# Patient Record
Sex: MALE | Race: WHITE | Employment: OTHER | URBAN - METROPOLITAN AREA
[De-identification: names, ages, dates, MRNs, and addresses within clinical notes are randomized per-mention and may not be internally consistent; named-entity substitution may affect disease eponyms.]

---

## 2017-04-16 ENCOUNTER — APPOINTMENT (OUTPATIENT)
Dept: CT IMAGING | Age: 67
End: 2017-04-16
Attending: EMERGENCY MEDICINE
Payer: MEDICARE

## 2017-04-16 ENCOUNTER — HOSPITAL ENCOUNTER (EMERGENCY)
Age: 67
Discharge: HOME | End: 2017-04-16
Attending: EMERGENCY MEDICINE
Payer: MEDICARE

## 2017-04-16 VITALS
HEIGHT: 72 IN | OXYGEN SATURATION: 100 % | WEIGHT: 196 LBS | RESPIRATION RATE: 16 BRPM | SYSTOLIC BLOOD PRESSURE: 143 MMHG | TEMPERATURE: 98 F | BODY MASS INDEX: 26.55 KG/M2 | HEART RATE: 68 BPM | DIASTOLIC BLOOD PRESSURE: 85 MMHG

## 2017-04-16 DIAGNOSIS — K04.7 DENTAL ABSCESS: Primary | ICD-10-CM

## 2017-04-16 LAB
ANION GAP BLD CALC-SCNC: 11 MMOL/L (ref 3–18)
BASOPHILS # BLD AUTO: 0 K/UL (ref 0–0.06)
BASOPHILS # BLD: 0 % (ref 0–2)
BUN SERPL-MCNC: 18 MG/DL (ref 7–18)
BUN/CREAT SERPL: 13 (ref 12–20)
CALCIUM SERPL-MCNC: 9.7 MG/DL (ref 8.5–10.1)
CHLORIDE SERPL-SCNC: 104 MMOL/L (ref 100–108)
CO2 SERPL-SCNC: 26 MMOL/L (ref 21–32)
CREAT SERPL-MCNC: 1.42 MG/DL (ref 0.6–1.3)
DIFFERENTIAL METHOD BLD: ABNORMAL
EOSINOPHIL # BLD: 0.1 K/UL (ref 0–0.4)
EOSINOPHIL NFR BLD: 1 % (ref 0–5)
ERYTHROCYTE [DISTWIDTH] IN BLOOD BY AUTOMATED COUNT: 13.1 % (ref 11.6–14.5)
GLUCOSE SERPL-MCNC: 116 MG/DL (ref 74–99)
HCT VFR BLD AUTO: 36.5 % (ref 36–48)
HGB BLD-MCNC: 12.4 G/DL (ref 13–16)
LYMPHOCYTES # BLD AUTO: 11 % (ref 21–52)
LYMPHOCYTES # BLD: 1.2 K/UL (ref 0.9–3.6)
MCH RBC QN AUTO: 31 PG (ref 24–34)
MCHC RBC AUTO-ENTMCNC: 34 G/DL (ref 31–37)
MCV RBC AUTO: 91.3 FL (ref 74–97)
MONOCYTES # BLD: 0.7 K/UL (ref 0.05–1.2)
MONOCYTES NFR BLD AUTO: 7 % (ref 3–10)
NEUTS SEG # BLD: 8.8 K/UL (ref 1.8–8)
NEUTS SEG NFR BLD AUTO: 81 % (ref 40–73)
PLATELET # BLD AUTO: 166 K/UL (ref 135–420)
PMV BLD AUTO: 9.5 FL (ref 9.2–11.8)
POTASSIUM SERPL-SCNC: 4.5 MMOL/L (ref 3.5–5.5)
RBC # BLD AUTO: 4 M/UL (ref 4.7–5.5)
SODIUM SERPL-SCNC: 141 MMOL/L (ref 136–145)
WBC # BLD AUTO: 10.9 K/UL (ref 4.6–13.2)

## 2017-04-16 PROCEDURE — 96375 TX/PRO/DX INJ NEW DRUG ADDON: CPT

## 2017-04-16 PROCEDURE — 74011636320 HC RX REV CODE- 636/320: Performed by: EMERGENCY MEDICINE

## 2017-04-16 PROCEDURE — 85025 COMPLETE CBC W/AUTO DIFF WBC: CPT | Performed by: EMERGENCY MEDICINE

## 2017-04-16 PROCEDURE — 99284 EMERGENCY DEPT VISIT MOD MDM: CPT

## 2017-04-16 PROCEDURE — 74011000250 HC RX REV CODE- 250: Performed by: EMERGENCY MEDICINE

## 2017-04-16 PROCEDURE — 80048 BASIC METABOLIC PNL TOTAL CA: CPT | Performed by: EMERGENCY MEDICINE

## 2017-04-16 PROCEDURE — 74011250636 HC RX REV CODE- 250/636: Performed by: EMERGENCY MEDICINE

## 2017-04-16 PROCEDURE — 96365 THER/PROPH/DIAG IV INF INIT: CPT

## 2017-04-16 PROCEDURE — 70491 CT SOFT TISSUE NECK W/DYE: CPT

## 2017-04-16 PROCEDURE — 74011000258 HC RX REV CODE- 258: Performed by: EMERGENCY MEDICINE

## 2017-04-16 RX ORDER — GUAIFENESIN 100 MG/5ML
81 LIQUID (ML) ORAL DAILY
COMMUNITY

## 2017-04-16 RX ORDER — SODIUM CHLORIDE 0.9 % (FLUSH) 0.9 %
5-10 SYRINGE (ML) INJECTION EVERY 8 HOURS
Status: DISCONTINUED | OUTPATIENT
Start: 2017-04-16 | End: 2017-04-17 | Stop reason: HOSPADM

## 2017-04-16 RX ORDER — RAMIPRIL 5 MG/1
5 CAPSULE ORAL DAILY
COMMUNITY
End: 2018-12-08

## 2017-04-16 RX ORDER — CLINDAMYCIN HYDROCHLORIDE 300 MG/1
300 CAPSULE ORAL 3 TIMES DAILY
Qty: 21 CAP | Refills: 0 | Status: SHIPPED | OUTPATIENT
Start: 2017-04-16 | End: 2017-04-26

## 2017-04-16 RX ORDER — KETOROLAC TROMETHAMINE 30 MG/ML
15 INJECTION, SOLUTION INTRAMUSCULAR; INTRAVENOUS
Status: COMPLETED | OUTPATIENT
Start: 2017-04-16 | End: 2017-04-16

## 2017-04-16 RX ORDER — HYDROCODONE BITARTRATE AND ACETAMINOPHEN 5; 325 MG/1; MG/1
1 TABLET ORAL
Qty: 20 TAB | Refills: 0 | Status: SHIPPED | OUTPATIENT
Start: 2017-04-16 | End: 2018-12-08

## 2017-04-16 RX ORDER — SODIUM CHLORIDE 0.9 % (FLUSH) 0.9 %
5-10 SYRINGE (ML) INJECTION AS NEEDED
Status: DISCONTINUED | OUTPATIENT
Start: 2017-04-16 | End: 2017-04-17 | Stop reason: HOSPADM

## 2017-04-16 RX ORDER — RABEPRAZOLE SODIUM 20 MG/1
20 TABLET, DELAYED RELEASE ORAL DAILY
Status: ON HOLD | COMMUNITY
End: 2018-12-08

## 2017-04-16 RX ADMIN — IOPAMIDOL 100 ML: 612 INJECTION, SOLUTION INTRAVENOUS at 21:30

## 2017-04-16 RX ADMIN — CLINDAMYCIN PHOSPHATE 900 MG: 150 INJECTION, SOLUTION, CONCENTRATE INTRAVENOUS at 20:19

## 2017-04-16 RX ADMIN — KETOROLAC TROMETHAMINE 15 MG: 30 INJECTION, SOLUTION INTRAMUSCULAR at 20:18

## 2017-04-16 NOTE — ED TRIAGE NOTES
C/o right sided facial pain, swelling since this morning, states he had dental work on 4/6/17 and has had runny nose. Sepsis Screening completed    (  )Patient meets SIRS criteria. (x  )Patient does not meet SIRS criteria.       SIRS Criteria is achieved when two or more of the following are present   Temperature < 96.8°F (36°C) or > 100.9°F (38.3°C)   Heart Rate > 90 beats per minute   Respiratory Rate > 20 breaths per minute   WBC count > 12,000 or <4,000 or > 10% bands

## 2017-04-16 NOTE — ED PROVIDER NOTES
HPI Comments: 7:45 PM   Stephanie Fierro is a 75 y.o. male presenting to the ED C/O right sided facial pain (8/10) with associated swelling onset yesterday evening. Pt had dental pain that started last night as well. Pt had dental work 10 days ago. PMHx of DM, high cholesterol, kidney stones, GERD, arrhythmia, and CKD. Pt is allergic to Sulfa. Pt denies sore throat and any other Sx or complaints. Patient is a 75 y.o. male presenting with facial pain. The history is provided by the patient. No  was used. Facial Pain    The incident occurred yesterday. He came to the ER via walk-in. The pain is at a severity of 8/10. There was no loss of consciousness. He has been behaving normally. Written by PATY Tamez, as dictated by Phuc Green MD    Past Medical History:   Diagnosis Date    Arrhythmia     \"skipped beat\". No problems since on metoprolol    Chronic kidney disease     kidney stones. Cr =1.9    Diabetes ([address] 2006    type 2    GERD (gastroesophageal reflux disease)     Irregular heart beats     Kidney stones     Other ill-defined conditions(799.89)     HIGH CHOLESTEROL    Reflux        Past Surgical History:   Procedure Laterality Date    HX ORTHOPAEDIC      KNEE    HX OTHER SURGICAL  1980's    varocele    HX UROLOGICAL  2010    lithotripsy X 1         No family history on file. Social History     Social History    Marital status:      Spouse name: N/A    Number of children: N/A    Years of education: N/A     Occupational History    Not on file.      Social History Main Topics    Smoking status: Never Smoker    Smokeless tobacco: Not on file    Alcohol use 0.5 oz/week     1 drink(s) per week    Drug use: No    Sexual activity: Not on file     Other Topics Concern    Not on file     Social History Narrative         ALLERGIES: Sulfa (sulfonamide antibiotics)    Review of Systems   HENT: Positive for dental problem and facial swelling (Right sided). Negative for sore throat. All other systems reviewed and are negative. Vitals:    04/16/17 1802 04/16/17 2015 04/16/17 2100   BP: (!) 141/93 (!) 155/91 142/81   Pulse: 75 72 71   Resp: 16 16 16   Temp: 98 °F (36.7 °C)     SpO2: 98% 97% 98%   Weight: 88.9 kg (196 lb)     Height: 6' (1.829 m)              Physical Exam   Constitutional: He is oriented to person, place, and time. He appears well-developed and well-nourished. No distress. No acute distress   HENT:   Head: Normocephalic. Mouth/Throat: Abnormal dentition (Dentition intact but mildly tender over tooth # 5 ). Obvious right face swelling with tenderness over the cheek. Nostril's clear. TM's clear. Throat clear   Eyes: Pupils are equal, round, and reactive to light. Neck: Neck supple. Cardiovascular: Normal rate, regular rhythm, S1 normal, S2 normal and normal heart sounds. Pulmonary/Chest: Breath sounds normal. No respiratory distress. He has no wheezes. He has no rales. He exhibits no tenderness. Abdominal: Soft. He exhibits no distension and no mass. There is no tenderness. There is no guarding. Musculoskeletal: Normal range of motion. He exhibits no edema or tenderness. Neurological: He is alert and oriented to person, place, and time. No cranial nerve deficit. Skin: No rash noted. Psychiatric: He has a normal mood and affect. His behavior is normal. Thought content normal.   Nursing note and vitals reviewed. RESULTS:    PULSE OXIMETRY NOTE:  7:49 PM   Pulse-ox is 98% on RA  Interpretation: Normal  Intervention: None      CT NECK SOFT TISSUE W CONT   Final Result   IMPRESSION:     Soft tissue swelling overlying right maxilla and nasolabial region. No abscess  formation. This could be related to dental disease, with an adjacent right  maxillary cuspid periapical lucency.  There is also partial opacification of the  maxillary sinuses.     As read by the radiologist.         Labs Reviewed   CBC WITH AUTOMATED DIFF - Abnormal; Notable for the following:        Result Value    RBC 4.00 (*)     HGB 12.4 (*)     NEUTROPHILS 81 (*)     LYMPHOCYTES 11 (*)     ABS. NEUTROPHILS 8.8 (*)     All other components within normal limits   METABOLIC PANEL, BASIC - Abnormal; Notable for the following:     Glucose 116 (*)     Creatinine 1.42 (*)     GFR est non-AA 50 (*)     All other components within normal limits       Recent Results (from the past 12 hour(s))   CBC WITH AUTOMATED DIFF    Collection Time: 04/16/17  8:15 PM   Result Value Ref Range    WBC 10.9 4.6 - 13.2 K/uL    RBC 4.00 (L) 4.70 - 5.50 M/uL    HGB 12.4 (L) 13.0 - 16.0 g/dL    HCT 36.5 36.0 - 48.0 %    MCV 91.3 74.0 - 97.0 FL    MCH 31.0 24.0 - 34.0 PG    MCHC 34.0 31.0 - 37.0 g/dL    RDW 13.1 11.6 - 14.5 %    PLATELET 330 150 - 941 K/uL    MPV 9.5 9.2 - 11.8 FL    NEUTROPHILS 81 (H) 40 - 73 %    LYMPHOCYTES 11 (L) 21 - 52 %    MONOCYTES 7 3 - 10 %    EOSINOPHILS 1 0 - 5 %    BASOPHILS 0 0 - 2 %    ABS. NEUTROPHILS 8.8 (H) 1.8 - 8.0 K/UL    ABS. LYMPHOCYTES 1.2 0.9 - 3.6 K/UL    ABS. MONOCYTES 0.7 0.05 - 1.2 K/UL    ABS. EOSINOPHILS 0.1 0.0 - 0.4 K/UL    ABS. BASOPHILS 0.0 0.0 - 0.06 K/UL    DF AUTOMATED     METABOLIC PANEL, BASIC    Collection Time: 04/16/17  8:15 PM   Result Value Ref Range    Sodium 141 136 - 145 mmol/L    Potassium 4.5 3.5 - 5.5 mmol/L    Chloride 104 100 - 108 mmol/L    CO2 26 21 - 32 mmol/L    Anion gap 11 3.0 - 18 mmol/L    Glucose 116 (H) 74 - 99 mg/dL    BUN 18 7.0 - 18 MG/DL    Creatinine 1.42 (H) 0.6 - 1.3 MG/DL    BUN/Creatinine ratio 13 12 - 20      GFR est AA >60 >60 ml/min/1.73m2    GFR est non-AA 50 (L) >60 ml/min/1.73m2    Calcium 9.7 8.5 - 10.1 MG/DL       MDM  Number of Diagnoses or Management Options  Diagnosis management comments: INITIAL CLINICAL IMPRESSION and PLANS:  The patient presents with the primary complaint(s) of: Right face swelling and pain.  The presentation, to include historical aspects and clinical findings are consistent with the DX of  Right face abscess. However, other possible DX's to consider as primary, associated with, or exacerbated by include:    sinusitis, dental abscess, trigeminal neurology, cellulitis    Considering the above, my initial management plan to evaluate and therapeutic interventions include the following and as noted in the orders:    1. Labs: CBC, Basic metabolic panel  2. Imaging: CT Maxillofacial LTD         Amount and/or Complexity of Data Reviewed  Clinical lab tests: reviewed and ordered (CBC, Basic metabolic panel)  Tests in the radiology section of CPT®: reviewed and ordered (CT Maxillofacial LTD)      ED Course     Medications   sodium chloride (NS) flush 5-10 mL (not administered)   sodium chloride (NS) flush 5-10 mL (not administered)   clindamycin phosphate 900 mg in 0.9% sodium chloride (MBP/ADV) 100 mL ADV (0 mg IntraVENous IV Completed 4/16/17 2048)   ketorolac (TORADOL) injection 15 mg (15 mg IntraVENous Given 4/16/17 2018)   iopamidol (ISOVUE 300) 61 % contrast injection 100 mL (100 mL IntraVENous Given 4/16/17 2130)      Procedures  PROGRESS NOTE:  7:45 PM  Initial assessment performed. Written by Bere Donald, ED Scribe, as dictated by Misael Rider MD     DISCHARGE NOTE:  10:47 PM   Myesha Cm  results have been reviewed with him. He has been counseled regarding his diagnosis, treatment, and plan. He verbally conveys understanding and agreement of the signs, symptoms, diagnosis, treatment and prognosis and additionally agrees to follow up as discussed. He also agrees with the care-plan and conveys that all of his questions have been answered. I have also provided discharge instructions for him that include: educational information regarding their diagnosis and treatment, and list of reasons why they would want to return to the ED prior to their follow-up appointment, should his condition change.  The patient and/or family has been provided with education for proper Emergency Department utilization. CLINICAL IMPRESSION:    1. Dental abscess        PLAN: DISCHARGE HOME    Follow-up Information     Follow up With Details Comments 605 Tico Martini MD Schedule an appointment as soon as possible for a visit in 1 day Follow up with your primary care physician [address] [place] [address] [phone]  [phone]      THE [facility] EMERGENCY DEPT Go to As needed, If symptoms worsen [address] [place] [facility] [address] [phone]          Current Discharge Medication List      START taking these medications    Details   clindamycin (CLEOCIN) 300 mg capsule Take 1 Cap by mouth three (3) times daily for 10 days. Qty: 21 Cap, Refills: 0      HYDROcodone-acetaminophen (NORCO) 5-325 mg per tablet Take 1 Tab by mouth every four (4) hours as needed for Pain. Max Daily Amount: 6 Tabs. Qty: 20 Tab, Refills: 0             ATTESTATIONS:  This note is prepared by Tonya Oropeza, acting as Scribe for Sheyla Gonzalez MD .    Sheyla Gonzalez MD : The scribe's documentation has been prepared under my direction and personally reviewed by me in its entirety. I confirm that the note above accurately reflects all work, treatment, procedures, and medical decision making performed by me.

## 2017-04-16 NOTE — Clinical Note
Please follow up with dentist 
Dr. Michaelle Vasquez, [address] 
[phone] [facility]: 
[address] [facility], [address] [facility] 
[phone] Fillings, Cleanings, and Extractions [address] [facility] [address] [facility] [place] [address] [place] 
[phone] Fillings, Cleanings, and Extractions [facility] [address] 
[phone] Fillings, Cleaning, and Extractions [facility] [address] [place], [address] [facility] 
[phone] [facility] [address] Cherylene Mullet, [address] [place] [address] 
[phone] Ages 3-18, if attending Cherylene Mullet school [address] [facility] 
[address] [facility], [address] 
[address] Extractions, Fillings, [employer] [facility], [address] [facility] [address] 
[phone] Oral Surgery - $70 required Cleanings, Fillings, Extractions - $100 Required [address], [address] 
[phone] [employer] [address] [facility] [employer] and [address], [address] Dental Clinic Open September to June

## 2017-04-17 NOTE — ED NOTES
Hourly Rounding:  Pt resting in NAD, RR equal and non-labored, side rails up x 2, bed in lowest position, call bell within reach, no needs at this time.

## 2017-04-17 NOTE — ED NOTES
Hourly Rounding:  Pt resting in NAD, RR equal and non-labored, side rails up x 2, bed in lowest position, call bell within reach, no needs at this time, grandson at bedside.

## 2018-12-08 ENCOUNTER — APPOINTMENT (OUTPATIENT)
Dept: GENERAL RADIOLOGY | Age: 68
End: 2018-12-08
Attending: PHYSICIAN ASSISTANT
Payer: MEDICARE

## 2018-12-08 ENCOUNTER — APPOINTMENT (OUTPATIENT)
Dept: GENERAL RADIOLOGY | Age: 68
End: 2018-12-08
Attending: UROLOGY
Payer: MEDICARE

## 2018-12-08 ENCOUNTER — HOSPITAL ENCOUNTER (OUTPATIENT)
Age: 68
Setting detail: OBSERVATION
Discharge: HOME | End: 2018-12-09
Attending: EMERGENCY MEDICINE | Admitting: UROLOGY
Payer: MEDICARE

## 2018-12-08 ENCOUNTER — ANESTHESIA EVENT (OUTPATIENT)
Dept: SURGERY | Age: 68
End: 2018-12-08
Payer: MEDICARE

## 2018-12-08 ENCOUNTER — APPOINTMENT (OUTPATIENT)
Dept: CT IMAGING | Age: 68
End: 2018-12-08
Attending: PHYSICIAN ASSISTANT
Payer: MEDICARE

## 2018-12-08 ENCOUNTER — ANESTHESIA (OUTPATIENT)
Dept: SURGERY | Age: 68
End: 2018-12-08
Payer: MEDICARE

## 2018-12-08 DIAGNOSIS — N20.1 URETEROLITHIASIS: Primary | ICD-10-CM

## 2018-12-08 DIAGNOSIS — N18.9 CHRONIC KIDNEY DISEASE, UNSPECIFIED CKD STAGE: ICD-10-CM

## 2018-12-08 DIAGNOSIS — R10.9 LEFT FLANK PAIN: ICD-10-CM

## 2018-12-08 PROBLEM — N17.9 ACUTE KIDNEY FAILURE (HCC): Status: ACTIVE | Noted: 2018-12-08

## 2018-12-08 PROBLEM — N23 RENAL COLIC: Status: ACTIVE | Noted: 2018-12-08

## 2018-12-08 LAB
ALBUMIN SERPL-MCNC: 3.6 G/DL (ref 3.4–5)
ALBUMIN/GLOB SERPL: 0.9 {RATIO} (ref 0.8–1.7)
ALP SERPL-CCNC: 96 U/L (ref 45–117)
ALT SERPL-CCNC: 21 U/L (ref 16–61)
ANION GAP SERPL CALC-SCNC: 9 MMOL/L (ref 3–18)
APPEARANCE UR: CLEAR
AST SERPL-CCNC: 16 U/L (ref 15–37)
BACTERIA URNS QL MICRO: ABNORMAL /HPF
BASOPHILS # BLD: 0 K/UL (ref 0–0.1)
BASOPHILS NFR BLD: 0 % (ref 0–2)
BILIRUB SERPL-MCNC: 0.5 MG/DL (ref 0.2–1)
BILIRUB UR QL: NEGATIVE
BUN SERPL-MCNC: 51 MG/DL (ref 7–18)
BUN/CREAT SERPL: 16
CALCIUM SERPL-MCNC: 8.7 MG/DL (ref 8.5–10.1)
CHLORIDE SERPL-SCNC: 107 MMOL/L (ref 100–108)
CK MB CFR SERPL CALC: 1.9 % (ref 0–4)
CK MB SERPL-MCNC: 1.3 NG/ML (ref 5–25)
CK SERPL-CCNC: 68 U/L (ref 39–308)
CO2 SERPL-SCNC: 20 MMOL/L (ref 21–32)
COLOR UR: YELLOW
CREAT SERPL-MCNC: 3.17 MG/DL (ref 0.6–1.3)
DIFFERENTIAL METHOD BLD: ABNORMAL
EOSINOPHIL # BLD: 0 K/UL (ref 0–0.4)
EOSINOPHIL NFR BLD: 0 % (ref 0–5)
EPITH CASTS URNS QL MICRO: ABNORMAL /LPF (ref 0–5)
ERYTHROCYTE [DISTWIDTH] IN BLOOD BY AUTOMATED COUNT: 13.8 % (ref 11.6–14.5)
GLOBULIN SER CALC-MCNC: 4 G/DL (ref 2–4)
GLUCOSE BLD STRIP.AUTO-MCNC: 100 MG/DL (ref 70–110)
GLUCOSE BLD STRIP.AUTO-MCNC: 108 MG/DL (ref 70–110)
GLUCOSE SERPL-MCNC: 115 MG/DL (ref 74–99)
GLUCOSE UR STRIP.AUTO-MCNC: NEGATIVE MG/DL
HCT VFR BLD AUTO: 37.1 % (ref 36–48)
HGB BLD-MCNC: 10.6 G/DL (ref 13–16)
HGB UR QL STRIP: NEGATIVE
KETONES UR QL STRIP.AUTO: NEGATIVE MG/DL
LEUKOCYTE ESTERASE UR QL STRIP.AUTO: NEGATIVE
LYMPHOCYTES # BLD: 0.6 K/UL (ref 0.9–3.6)
LYMPHOCYTES NFR BLD: 7 % (ref 21–52)
MAGNESIUM SERPL-MCNC: 1.8 MG/DL (ref 1.6–2.6)
MCH RBC QN AUTO: 29.5 PG (ref 24–34)
MCHC RBC AUTO-ENTMCNC: 28.6 G/DL (ref 31–37)
MCV RBC AUTO: 103.3 FL (ref 74–97)
MONOCYTES # BLD: 0.7 K/UL (ref 0.05–1.2)
MONOCYTES NFR BLD: 9 % (ref 3–10)
NEUTS SEG # BLD: 6.8 K/UL (ref 1.8–8)
NEUTS SEG NFR BLD: 84 % (ref 40–73)
NITRITE UR QL STRIP.AUTO: NEGATIVE
PH UR STRIP: 5 [PH] (ref 5–8)
PLATELET # BLD AUTO: 219 K/UL (ref 135–420)
PMV BLD AUTO: 11.5 FL (ref 9.2–11.8)
POTASSIUM SERPL-SCNC: 4.5 MMOL/L (ref 3.5–5.5)
PROT SERPL-MCNC: 7.6 G/DL (ref 6.4–8.2)
PROT UR STRIP-MCNC: 30 MG/DL
RBC # BLD AUTO: 3.59 M/UL (ref 4.7–5.5)
RBC #/AREA URNS HPF: ABNORMAL /HPF (ref 0–5)
SODIUM SERPL-SCNC: 136 MMOL/L (ref 136–145)
SP GR UR REFRACTOMETRY: 1.02 (ref 1–1.03)
TROPONIN I SERPL-MCNC: <0.02 NG/ML (ref 0–0.04)
TROPONIN I SERPL-MCNC: <0.02 NG/ML (ref 0–0.04)
UROBILINOGEN UR QL STRIP.AUTO: 0.2 EU/DL (ref 0.2–1)
WBC # BLD AUTO: 8.2 K/UL (ref 4.6–13.2)
WBC URNS QL MICRO: ABNORMAL /HPF (ref 0–5)

## 2018-12-08 PROCEDURE — 96361 HYDRATE IV INFUSION ADD-ON: CPT

## 2018-12-08 PROCEDURE — 76010000138 HC OR TIME 0.5 TO 1 HR: Performed by: UROLOGY

## 2018-12-08 PROCEDURE — 74011250637 HC RX REV CODE- 250/637: Performed by: UROLOGY

## 2018-12-08 PROCEDURE — 96375 TX/PRO/DX INJ NEW DRUG ADDON: CPT

## 2018-12-08 PROCEDURE — 74011250636 HC RX REV CODE- 250/636

## 2018-12-08 PROCEDURE — 80053 COMPREHEN METABOLIC PANEL: CPT

## 2018-12-08 PROCEDURE — 77030019927 HC TBNG IRR CYSTO BAXT -A: Performed by: UROLOGY

## 2018-12-08 PROCEDURE — 74176 CT ABD & PELVIS W/O CONTRAST: CPT

## 2018-12-08 PROCEDURE — C2617 STENT, NON-COR, TEM W/O DEL: HCPCS | Performed by: UROLOGY

## 2018-12-08 PROCEDURE — 74022 RADEX COMPL AQT ABD SERIES: CPT

## 2018-12-08 PROCEDURE — 82553 CREATINE MB FRACTION: CPT

## 2018-12-08 PROCEDURE — 93005 ELECTROCARDIOGRAM TRACING: CPT

## 2018-12-08 PROCEDURE — 36415 COLL VENOUS BLD VENIPUNCTURE: CPT

## 2018-12-08 PROCEDURE — 96374 THER/PROPH/DIAG INJ IV PUSH: CPT

## 2018-12-08 PROCEDURE — 99218 HC RM OBSERVATION: CPT

## 2018-12-08 PROCEDURE — 85025 COMPLETE CBC W/AUTO DIFF WBC: CPT

## 2018-12-08 PROCEDURE — 76060000032 HC ANESTHESIA 0.5 TO 1 HR: Performed by: UROLOGY

## 2018-12-08 PROCEDURE — 74011636320 HC RX REV CODE- 636/320: Performed by: UROLOGY

## 2018-12-08 PROCEDURE — 74420 UROGRAPHY RTRGR +-KUB: CPT

## 2018-12-08 PROCEDURE — 77030018836 HC SOL IRR NACL ICUM -A: Performed by: UROLOGY

## 2018-12-08 PROCEDURE — 77030012508 HC MSK AIRWY LMA AMBU -A: Performed by: ANESTHESIOLOGY

## 2018-12-08 PROCEDURE — 77030032490 HC SLV COMPR SCD KNE COVD -B: Performed by: UROLOGY

## 2018-12-08 PROCEDURE — 77030018846 HC SOL IRR STRL H20 ICUM -A: Performed by: UROLOGY

## 2018-12-08 PROCEDURE — 83735 ASSAY OF MAGNESIUM: CPT

## 2018-12-08 PROCEDURE — C1758 CATHETER, URETERAL: HCPCS | Performed by: UROLOGY

## 2018-12-08 PROCEDURE — 81001 URINALYSIS AUTO W/SCOPE: CPT

## 2018-12-08 PROCEDURE — 76210000006 HC OR PH I REC 0.5 TO 1 HR: Performed by: UROLOGY

## 2018-12-08 PROCEDURE — 82962 GLUCOSE BLOOD TEST: CPT

## 2018-12-08 PROCEDURE — 74011250636 HC RX REV CODE- 250/636: Performed by: PHYSICIAN ASSISTANT

## 2018-12-08 PROCEDURE — 96376 TX/PRO/DX INJ SAME DRUG ADON: CPT

## 2018-12-08 PROCEDURE — 99284 EMERGENCY DEPT VISIT MOD MDM: CPT

## 2018-12-08 PROCEDURE — 74011250636 HC RX REV CODE- 250/636: Performed by: UROLOGY

## 2018-12-08 DEVICE — URETERAL STENT
Type: IMPLANTABLE DEVICE | Site: URETER | Status: FUNCTIONAL
Brand: POLARIS™ ULTRA

## 2018-12-08 RX ORDER — ONDANSETRON 2 MG/ML
4 INJECTION INTRAMUSCULAR; INTRAVENOUS
Status: COMPLETED | OUTPATIENT
Start: 2018-12-08 | End: 2018-12-08

## 2018-12-08 RX ORDER — NALOXONE HYDROCHLORIDE 0.4 MG/ML
0.1 INJECTION, SOLUTION INTRAMUSCULAR; INTRAVENOUS; SUBCUTANEOUS AS NEEDED
Status: DISCONTINUED | OUTPATIENT
Start: 2018-12-08 | End: 2018-12-08 | Stop reason: HOSPADM

## 2018-12-08 RX ORDER — FLUMAZENIL 0.1 MG/ML
0.2 INJECTION INTRAVENOUS
Status: DISCONTINUED | OUTPATIENT
Start: 2018-12-08 | End: 2018-12-08 | Stop reason: HOSPADM

## 2018-12-08 RX ORDER — IRBESARTAN 75 MG/1
75 TABLET ORAL DAILY
COMMUNITY

## 2018-12-08 RX ORDER — CEPHALEXIN 250 MG/1
500 CAPSULE ORAL EVERY 6 HOURS
Status: DISCONTINUED | OUTPATIENT
Start: 2018-12-09 | End: 2018-12-09 | Stop reason: HOSPADM

## 2018-12-08 RX ORDER — SODIUM CHLORIDE 0.9 % (FLUSH) 0.9 %
5-10 SYRINGE (ML) INJECTION EVERY 8 HOURS
Status: DISCONTINUED | OUTPATIENT
Start: 2018-12-08 | End: 2018-12-09 | Stop reason: HOSPADM

## 2018-12-08 RX ORDER — DEXTROSE 50 % IN WATER (D50W) INTRAVENOUS SYRINGE
25-50 AS NEEDED
Status: DISCONTINUED | OUTPATIENT
Start: 2018-12-08 | End: 2018-12-08 | Stop reason: HOSPADM

## 2018-12-08 RX ORDER — MORPHINE SULFATE 4 MG/ML
4 INJECTION INTRAVENOUS
Status: COMPLETED | OUTPATIENT
Start: 2018-12-08 | End: 2018-12-08

## 2018-12-08 RX ORDER — MIDAZOLAM HYDROCHLORIDE 1 MG/ML
INJECTION, SOLUTION INTRAMUSCULAR; INTRAVENOUS AS NEEDED
Status: DISCONTINUED | OUTPATIENT
Start: 2018-12-08 | End: 2018-12-08 | Stop reason: HOSPADM

## 2018-12-08 RX ORDER — MAGNESIUM SULFATE 100 %
4 CRYSTALS MISCELLANEOUS AS NEEDED
Status: DISCONTINUED | OUTPATIENT
Start: 2018-12-08 | End: 2018-12-08 | Stop reason: HOSPADM

## 2018-12-08 RX ORDER — ATORVASTATIN CALCIUM 10 MG/1
10 TABLET, FILM COATED ORAL DAILY
Status: DISCONTINUED | OUTPATIENT
Start: 2018-12-09 | End: 2018-12-08

## 2018-12-08 RX ORDER — SODIUM CHLORIDE, SODIUM LACTATE, POTASSIUM CHLORIDE, CALCIUM CHLORIDE 600; 310; 30; 20 MG/100ML; MG/100ML; MG/100ML; MG/100ML
INJECTION, SOLUTION INTRAVENOUS
Status: DISCONTINUED | OUTPATIENT
Start: 2018-12-08 | End: 2018-12-08 | Stop reason: HOSPADM

## 2018-12-08 RX ORDER — METOPROLOL SUCCINATE 25 MG/1
25 TABLET, EXTENDED RELEASE ORAL DAILY
Status: DISCONTINUED | OUTPATIENT
Start: 2018-12-09 | End: 2018-12-09 | Stop reason: HOSPADM

## 2018-12-08 RX ORDER — IRBESARTAN 75 MG/1
75 TABLET ORAL
Status: ON HOLD | COMMUNITY
End: 2018-12-08

## 2018-12-08 RX ORDER — HYDROMORPHONE HYDROCHLORIDE 2 MG/ML
0.5 INJECTION, SOLUTION INTRAMUSCULAR; INTRAVENOUS; SUBCUTANEOUS
Status: DISCONTINUED | OUTPATIENT
Start: 2018-12-08 | End: 2018-12-08 | Stop reason: HOSPADM

## 2018-12-08 RX ORDER — RABEPRAZOLE SODIUM 20 MG/1
20 TABLET, DELAYED RELEASE ORAL DAILY
COMMUNITY

## 2018-12-08 RX ORDER — HEPARIN SODIUM 5000 [USP'U]/ML
5000 INJECTION, SOLUTION INTRAVENOUS; SUBCUTANEOUS EVERY 8 HOURS
Status: DISCONTINUED | OUTPATIENT
Start: 2018-12-08 | End: 2018-12-09 | Stop reason: HOSPADM

## 2018-12-08 RX ORDER — RABEPRAZOLE SODIUM 20 MG/1
20 TABLET, DELAYED RELEASE ORAL DAILY
Status: DISCONTINUED | OUTPATIENT
Start: 2018-12-09 | End: 2018-12-08 | Stop reason: CLARIF

## 2018-12-08 RX ORDER — CEFAZOLIN SODIUM IN 0.9 % NACL 2 G/100 ML
PLASTIC BAG, INJECTION (ML) INTRAVENOUS AS NEEDED
Status: DISCONTINUED | OUTPATIENT
Start: 2018-12-08 | End: 2018-12-08 | Stop reason: HOSPADM

## 2018-12-08 RX ORDER — SODIUM CHLORIDE 0.9 % (FLUSH) 0.9 %
5-10 SYRINGE (ML) INJECTION AS NEEDED
Status: DISCONTINUED | OUTPATIENT
Start: 2018-12-08 | End: 2018-12-09 | Stop reason: HOSPADM

## 2018-12-08 RX ORDER — GLYCOPYRROLATE 0.2 MG/ML
INJECTION INTRAMUSCULAR; INTRAVENOUS AS NEEDED
Status: DISCONTINUED | OUTPATIENT
Start: 2018-12-08 | End: 2018-12-08 | Stop reason: HOSPADM

## 2018-12-08 RX ORDER — ATORVASTATIN CALCIUM 40 MG/1
40 TABLET, FILM COATED ORAL DAILY
COMMUNITY

## 2018-12-08 RX ORDER — IRBESARTAN 75 MG/1
75 TABLET ORAL DAILY
Status: DISCONTINUED | OUTPATIENT
Start: 2018-12-09 | End: 2018-12-09 | Stop reason: HOSPADM

## 2018-12-08 RX ORDER — METOCLOPRAMIDE HYDROCHLORIDE 5 MG/ML
INJECTION INTRAMUSCULAR; INTRAVENOUS AS NEEDED
Status: DISCONTINUED | OUTPATIENT
Start: 2018-12-08 | End: 2018-12-08 | Stop reason: HOSPADM

## 2018-12-08 RX ORDER — CEFAZOLIN SODIUM/WATER 2 G/20 ML
2 SYRINGE (ML) INTRAVENOUS ONCE
Status: DISCONTINUED | OUTPATIENT
Start: 2018-12-08 | End: 2018-12-08 | Stop reason: HOSPADM

## 2018-12-08 RX ORDER — PROPOFOL 10 MG/ML
INJECTION, EMULSION INTRAVENOUS AS NEEDED
Status: DISCONTINUED | OUTPATIENT
Start: 2018-12-08 | End: 2018-12-08 | Stop reason: HOSPADM

## 2018-12-08 RX ORDER — FENTANYL CITRATE 50 UG/ML
50 INJECTION, SOLUTION INTRAMUSCULAR; INTRAVENOUS AS NEEDED
Status: DISCONTINUED | OUTPATIENT
Start: 2018-12-08 | End: 2018-12-08 | Stop reason: HOSPADM

## 2018-12-08 RX ORDER — METOPROLOL TARTRATE 25 MG/1
25 TABLET, FILM COATED ORAL 2 TIMES DAILY
Status: DISCONTINUED | OUTPATIENT
Start: 2018-12-08 | End: 2018-12-08

## 2018-12-08 RX ORDER — METFORMIN HYDROCHLORIDE 500 MG/1
500 TABLET ORAL 2 TIMES DAILY WITH MEALS
Status: DISCONTINUED | OUTPATIENT
Start: 2018-12-09 | End: 2018-12-08

## 2018-12-08 RX ORDER — PANTOPRAZOLE SODIUM 40 MG/1
40 TABLET, DELAYED RELEASE ORAL
Status: DISCONTINUED | OUTPATIENT
Start: 2018-12-09 | End: 2018-12-09 | Stop reason: HOSPADM

## 2018-12-08 RX ORDER — SODIUM CHLORIDE, SODIUM LACTATE, POTASSIUM CHLORIDE, CALCIUM CHLORIDE 600; 310; 30; 20 MG/100ML; MG/100ML; MG/100ML; MG/100ML
75 INJECTION, SOLUTION INTRAVENOUS CONTINUOUS
Status: DISCONTINUED | OUTPATIENT
Start: 2018-12-08 | End: 2018-12-09 | Stop reason: HOSPADM

## 2018-12-08 RX ORDER — IRBESARTAN 75 MG/1
75 TABLET ORAL
Status: DISCONTINUED | OUTPATIENT
Start: 2018-12-08 | End: 2018-12-08

## 2018-12-08 RX ORDER — MORPHINE SULFATE 2 MG/ML
4 INJECTION, SOLUTION INTRAMUSCULAR; INTRAVENOUS
Status: COMPLETED | OUTPATIENT
Start: 2018-12-08 | End: 2018-12-08

## 2018-12-08 RX ORDER — LIDOCAINE HYDROCHLORIDE 20 MG/ML
INJECTION, SOLUTION EPIDURAL; INFILTRATION; INTRACAUDAL; PERINEURAL AS NEEDED
Status: DISCONTINUED | OUTPATIENT
Start: 2018-12-08 | End: 2018-12-08 | Stop reason: HOSPADM

## 2018-12-08 RX ORDER — METOPROLOL SUCCINATE 25 MG/1
25 TABLET, EXTENDED RELEASE ORAL DAILY
COMMUNITY

## 2018-12-08 RX ORDER — METFORMIN HYDROCHLORIDE 500 MG/1
1000 TABLET ORAL 2 TIMES DAILY WITH MEALS
Status: DISCONTINUED | OUTPATIENT
Start: 2018-12-09 | End: 2018-12-09 | Stop reason: HOSPADM

## 2018-12-08 RX ORDER — ATORVASTATIN CALCIUM 20 MG/1
40 TABLET, FILM COATED ORAL DAILY
Status: DISCONTINUED | OUTPATIENT
Start: 2018-12-09 | End: 2018-12-09 | Stop reason: HOSPADM

## 2018-12-08 RX ADMIN — MORPHINE SULFATE 4 MG: 4 INJECTION INTRAVENOUS at 16:34

## 2018-12-08 RX ADMIN — ONDANSETRON 4 MG: 2 INJECTION INTRAMUSCULAR; INTRAVENOUS at 13:59

## 2018-12-08 RX ADMIN — CEPHALEXIN 500 MG: 250 CAPSULE ORAL at 23:05

## 2018-12-08 RX ADMIN — SODIUM CHLORIDE, SODIUM LACTATE, POTASSIUM CHLORIDE, CALCIUM CHLORIDE: 600; 310; 30; 20 INJECTION, SOLUTION INTRAVENOUS at 18:05

## 2018-12-08 RX ADMIN — LIDOCAINE HYDROCHLORIDE 60 MG: 20 INJECTION, SOLUTION EPIDURAL; INFILTRATION; INTRACAUDAL; PERINEURAL at 18:14

## 2018-12-08 RX ADMIN — HEPARIN SODIUM 5000 UNITS: 5000 INJECTION INTRAVENOUS; SUBCUTANEOUS at 21:06

## 2018-12-08 RX ADMIN — MIDAZOLAM HYDROCHLORIDE 2 MG: 1 INJECTION, SOLUTION INTRAMUSCULAR; INTRAVENOUS at 18:05

## 2018-12-08 RX ADMIN — Medication 2 G: at 18:18

## 2018-12-08 RX ADMIN — SODIUM CHLORIDE, SODIUM LACTATE, POTASSIUM CHLORIDE, AND CALCIUM CHLORIDE 75 ML/HR: 600; 310; 30; 20 INJECTION, SOLUTION INTRAVENOUS at 21:06

## 2018-12-08 RX ADMIN — MORPHINE SULFATE 4 MG: 2 INJECTION, SOLUTION INTRAMUSCULAR; INTRAVENOUS at 13:59

## 2018-12-08 RX ADMIN — GLYCOPYRROLATE 0.2 MG: 0.2 INJECTION INTRAMUSCULAR; INTRAVENOUS at 18:08

## 2018-12-08 RX ADMIN — METOCLOPRAMIDE HYDROCHLORIDE 10 MG: 5 INJECTION INTRAMUSCULAR; INTRAVENOUS at 18:08

## 2018-12-08 RX ADMIN — PROPOFOL 200 MG: 10 INJECTION, EMULSION INTRAVENOUS at 18:14

## 2018-12-08 RX ADMIN — ONDANSETRON 4 MG: 2 INJECTION INTRAMUSCULAR; INTRAVENOUS at 16:30

## 2018-12-08 RX ADMIN — SODIUM CHLORIDE 1000 ML: 900 INJECTION, SOLUTION INTRAVENOUS at 13:59

## 2018-12-08 NOTE — PERIOP NOTES
TRANSFER - IN REPORT: 
 
Verbal report received from ERAN Agudelo RN and Nichole CRNA(name) on Othella Pilling  being received from OR(unit) for routine post - op Report consisted of patients Situation, Background, Assessment and  
Recommendations(SBAR). Information from the following report(s) SBAR, OR Summary, Procedure Summary and Intake/Output was reviewed with the receiving nurse. Opportunity for questions and clarification was provided. Assessment completed upon patients arrival to unit and care assumed.

## 2018-12-08 NOTE — CONSULTS
[facility]   Urology Consult Note  12/8/2018    Consulting MD: Char Doyle MD  Referring MD: Bayron     Assessment: Left  Obstructing  Stone   With mild renal insufficiency      Plan:cysto  , L RPG  /  Hazel Portillo stent  placement  /  Push back   KUB   In  AM           Ken Richards is a 70y.o. year old  male who was admitted for   One  Month  Left  CVAT   ER  Tonight  ,  No  Fever  Chills CR  Baseline  2  Now  3.17      , C/o     CT    Moderate left-sided hydronephrosis with asymmetric  perinephric fat stranding and inflammatory changes extending along the pararenal  space secondary to a obstructing 4-5 mm calculus in the proximal left ureter. There are additional punctate nonobstructing calculi scattered bilaterally    Additional  Complaints:  . Patient Active Problem List    Diagnosis Date Noted    Ureterolithiasis 12/08/2018     Past Medical History:   Diagnosis Date    Arrhythmia     \"skipped beat\". No problems since on metoprolol    Chronic kidney disease     kidney stones. Cr =1.9    Diabetes ([address] 2006    type 2    GERD (gastroesophageal reflux disease)     Irregular heart beats     Kidney stones     Other ill-defined conditions(799.89)     HIGH CHOLESTEROL    Reflux      Past Surgical History:   Procedure Laterality Date    HX ORTHOPAEDIC      KNEE    HX OTHER SURGICAL  1980's    varocele    HX UROLOGICAL  2010    lithotripsy X 1     Allergies   Allergen Reactions    Sulfa (Sulfonamide Antibiotics) Hives     History reviewed. No pertinent family history.   Current Facility-Administered Medications   Medication Dose Route Frequency Provider Last Rate Last Dose    sodium chloride (NS) flush 5-10 mL  5-10 mL IntraVENous Q8H Joellen Calixto MD        sodium chloride (NS) flush 5-10 mL  5-10 mL IntraVENous PRN Daisy Arechiga MD        fentaNYL citrate (PF) injection 50 mcg  50 mcg IntraVENous PRN Daisy Arechiga MD        HYDROmorphone (DILAUDID) injection 0.5 mg  0.5 mg IntraVENous Paulina Escobar MD        naloxone [facility]) injection 0.1 mg  0.1 mg IntraVENous PRN Bernard Escobar MD        flumazenil (ROMAZICON) 0.1 mg/mL injection 0.2 mg  0.2 mg IntraVENous Multiple Bernard Escobar MD        glucose chewable tablet 16 g  4 Tab Oral PRN Bernard Escobar MD        glucagon (GLUCAGEN) injection 1 mg  1 mg IntraMUSCular PRN Bernard Escobar MD        dextrose (D50W) injection syrg 12.5-25 g  25-50 mL IntraVENous PRN Bernard Escobar MD        ceFAZolin (ANCEF) 2 g/20 mL in sterile water IV syringe  2 g IntraVENous Huan Mi MD        iopamidol (ISOVUE 300) 61 % contrast injection    PRN Evgeny Reza MD   30 mL at 12/08/18 1829     Facility-Administered Medications Ordered in Other Encounters   Medication Dose Route Frequency Provider Last Rate Last Dose    midazolam (VERSED) injection   IntraVENous PRN May-Such, Dewey Terrazas CRNA   2 mg at 12/08/18 1805    lactated Ringers infusion   IntraVENous CONTINUOUS May-Such, Dewey Terrazas CRNA   Stopped at 12/08/18 1835    glycopyrrolate (ROBINUL) injection   IntraVENous PRN May-SuchDewey CRNA   0.2 mg at 12/08/18 1808    metoclopramide HCl (REGLAN) injection   IntraVENous PRN May-Such, Dewey Terrazas CRNA   10 mg at 12/08/18 1808    ceFAZolin (ANCEF) 2g in 100 ml 0.9% NS IVPB   IntraVENous PRN May-Such, Dewey Terrazas CRNA   2 g at 12/08/18 1818    PHENYLephrine (ANN-SYNEPHRINE) 10,000 mcg in 0.9% sodium chloride 100 mL infusion   IntraVENous CONTINUOUS May-Such, Dewey Terrazas CRNA   100 mcg at 12/08/18 1829    lidocaine (PF) (XYLOCAINE) 20 mg/mL (2 %) injection   IntraVENous PRN May-SuchDewey CRNA   60 mg at 12/08/18 1814    propofol (DIPRIVAN) 10 mg/mL injection   IntraVENous PRN May-SuchDewey CRNA   200 mg at 12/08/18 1814         Review of symptoms  As previously outlined and discussed per HPI  Objective:   Physical Exam:  PHYSICAL EXAM  Visit Vitals  /67   Pulse 62   Temp 97.8 °F (36.6 °C)   Resp 12   Ht 6' (1.829 m)   Wt 170 lb (77.1 kg)   SpO2 100%   BMI 23.06 kg/m²         GENERAL APPEARANCE:  70 y.o.  male appears NL not obese;  SKIN:  no ecchymoses  HEENT:  Head: Normal, normocephalic, atraumatic. NECK:  no nuchal rigidity  RESPIRATORY:    CARDIAC:  {Exam; heart:5510::\"regular rate and rhythm,   ABDOMEN:  flat soft, non-tender. Bowel sounds normal. No masses,  no organomegaly, normal findings: BACK:  negative  GENITOURINARY:    Penis: NL:  Foreskin:  NL   Scrotum:   NL   Epididymides: NL    Testes:   NL    MUSCULOSKELETAL:  negative  NEUROLOGIC:  oriented, normal mood, no focal deficits, Gait normal. Reflexes normal and symmetric.   Back:No CVAT    Visit Vitals  /67   Pulse 62   Temp 97.8 °F (36.6 °C)   Resp 12   Ht 6' (1.829 m)   Wt 170 lb (77.1 kg)   SpO2 100%   BMI 23.06 kg/m²     Lab:      PSA    [unfilled]         Basic Metabolic Profile   Lab Results   Component Value Date     12/08/2018    CO2 20 (L) 12/08/2018    BUN 51 (H) 12/08/2018          CBC w/Diff    Lab Results   Component Value Date/Time    WBC 8.2 12/08/2018 01:36 PM    RBC 3.59 (L) 12/08/2018 01:36 PM    HCT 37.1 12/08/2018 01:36 PM    .3 (H) 12/08/2018 01:36 PM    MCH 29.5 12/08/2018 01:36 PM    MCHC 28.6 (L) 12/08/2018 01:36 PM    RDW 13.8 12/08/2018 01:36 PM    Lab Results   Component Value Date/Time    MONOS 9 12/08/2018 01:36 PM    EOS 0 12/08/2018 01:36 PM    BASOS 0 12/08/2018 01:36 PM      No components found for: CREAT  Coagulation   No results found for: INR, APTT   [unfilled]    Winston Gill MD  [facility]

## 2018-12-08 NOTE — ED PROVIDER NOTES
EMERGENCY DEPARTMENT HISTORY AND PHYSICAL EXAM 
 
Date: 12/8/2018 Patient Name: John Currie History of Presenting Illness Chief Complaint Patient presents with  Flank Pain History Provided By: Patient Chief Complaint: Left flank pain Duration: 1 Months Timing:  Constant Location: Left flank Severity: 10 out of 10 Associated Symptoms: left groin pain, left back pain, nausea, SOB, decreased appetite, unintentional weight loss (40 lbs in the past year) \"everything tasted terrible\" up until 4 months ago Additional History (Context):  
1:31 PM 
John Currie is a 70 y.o. male with PMHX kidney stones, DM, palpitations, elevated creatinine (~2.6) presents to the emergency department C/O constant moderate left flank pain flaring to (10/10) this morning onset 1 month ago. Associated sxs include left groin pain, left back pain, nausea, SOB, decreased appetite, unintentional weight loss (40 lbs in the past year) \"everything tasted terrible\" up until 4 months ago. He saw his Renal specialist in mid-November for the same but was not found to have and kidney stones, he has a follow up appointment om February. He had a stress-test performed about 8 months ago. He now takes one Janumet in the mornings, down from BID. He is on low-dose Metoprolol for his palpitations, which has reduced his episodes of dizziness and syncope. Pt denies fall, recent trauma, fever, vomiting, chest pain, leg pain, leg swelling, rash, and any other sxs or complaints. PCP: Yusuf, MD Lindsay 
 
Current Outpatient Medications Medication Sig Dispense Refill  irbesartan (AVAPRO) 75 mg tablet Take 75 mg by mouth nightly.  aspirin 81 mg chewable tablet Take 81 mg by mouth daily.  sitaGLIPtin-metFORMIN (JANUMET)  mg per tablet Take 1 Tab by mouth two (2) times daily (with meals).  atorvastatin (LIPITOR) 10 mg tablet Take 10 mg by mouth daily. Pt. Instructed to take the morning of surgery.  metoprolol (LOPRESSOR) 25 mg tablet Take 25 mg by mouth two (2) times a day. Pt. Instructed to take the morning of surgery. Indications: per pt \"Skipped heartbeat\"  RABEprazole (ACIPHEX) 20 mg tablet Take 20 mg by mouth daily. Past History Past Medical History: 
Past Medical History:  
Diagnosis Date  Arrhythmia \"skipped beat\". No problems since on metoprolol  Chronic kidney disease   
 kidney stones. Cr =1.9  Diabetes ([address] 2006  
 type 2  
 GERD (gastroesophageal reflux disease)  Irregular heart beats  Kidney stones  Other ill-defined conditions(799.89) HIGH CHOLESTEROL  Reflux Past Surgical History: 
Past Surgical History:  
Procedure Laterality Date  HX ORTHOPAEDIC    
 KNEE  
 HX OTHER SURGICAL  1980's  
 varocele  HX UROLOGICAL  2010  
 lithotripsy X 1 Family History: 
History reviewed. No pertinent family history. Social History: 
Social History Tobacco Use  Smoking status: Never Smoker Substance Use Topics  Alcohol use: No  
  Alcohol/week: 0.5 oz Types: 1 Standard drinks or equivalent per week Frequency: Never  Drug use: No  
 
 
Allergies: Allergies Allergen Reactions  Sulfa (Sulfonamide Antibiotics) Hives Review of Systems Review of Systems Constitutional: Positive for appetite change (decreased) and unexpected weight change (weight loss). Negative for fever. Respiratory: Positive for shortness of breath. Cardiovascular: Negative for chest pain and leg swelling. Gastrointestinal: Positive for nausea. Negative for vomiting. Genitourinary: Positive for flank pain (left). (+) Left groin Musculoskeletal: Positive for back pain (left). Negative for myalgias (leg pain). Skin: Negative for rash. All other systems reviewed and are negative. Physical Exam  
 
Vitals:  
 12/08/18 1320 BP: 101/62 Pulse: 69 Resp: 16 Temp: 97.9 °F (36.6 °C) SpO2: 100% Weight: 77.1 kg (170 lb) Height: 6' (1.829 m) Physical Exam  
Constitutional: He is oriented to person, place, and time. He appears well-developed and well-nourished. No distress. HENT:  
Head: Normocephalic and atraumatic. Eyes: Conjunctivae and EOM are normal. Pupils are equal, round, and reactive to light. Neck: Normal range of motion. Neck supple. Cardiovascular: Normal rate and regular rhythm. Pulmonary/Chest: Effort normal and breath sounds normal.  
Abdominal: Soft. Bowel sounds are normal. He exhibits no distension. There is no tenderness. There is no rebound and no guarding. Points to left flank as site of pain Musculoskeletal: Normal range of motion. Neurological: He is alert and oriented to person, place, and time. Skin: Skin is warm and dry. Psychiatric: He has a normal mood and affect. His behavior is normal.  
Nursing note and vitals reviewed. Diagnostic Study Results Labs: 
  
Recent Results (from the past 12 hour(s)) URINALYSIS W/ RFLX MICROSCOPIC Collection Time: 12/08/18  1:34 PM  
Result Value Ref Range Color YELLOW Appearance CLEAR Specific gravity 1.020 1.005 - 1.030    
 pH (UA) 5.0 5.0 - 8.0 Protein 30 (A) NEG mg/dL Glucose NEGATIVE  NEG mg/dL Ketone NEGATIVE  NEG mg/dL Bilirubin NEGATIVE  NEG Blood NEGATIVE  NEG Urobilinogen 0.2 0.2 - 1.0 EU/dL Nitrites NEGATIVE  NEG Leukocyte Esterase NEGATIVE  NEG    
URINE MICROSCOPIC ONLY Collection Time: 12/08/18  1:34 PM  
Result Value Ref Range WBC 0 to 3 0 - 5 /hpf  
 RBC 0 to 3 0 - 5 /hpf Epithelial cells FEW 0 - 5 /lpf Bacteria 1+ (A) NEG /hpf  
CBC WITH AUTOMATED DIFF Collection Time: 12/08/18  1:36 PM  
Result Value Ref Range WBC 8.2 4.6 - 13.2 K/uL  
 RBC 3.59 (L) 4.70 - 5.50 M/uL  
 HGB 10.6 (L) 13.0 - 16.0 g/dL HCT 37.1 36.0 - 48.0 % .3 (H) 74.0 - 97.0 FL  
 MCH 29.5 24.0 - 34.0 PG  
 MCHC 28.6 (L) 31.0 - 37.0 g/dL RDW 13.8 11.6 - 14.5 % PLATELET 176 210 - 309 K/uL MPV 11.5 9.2 - 11.8 FL  
 NEUTROPHILS 84 (H) 40 - 73 % LYMPHOCYTES 7 (L) 21 - 52 % MONOCYTES 9 3 - 10 % EOSINOPHILS 0 0 - 5 % BASOPHILS 0 0 - 2 %  
 ABS. NEUTROPHILS 6.8 1.8 - 8.0 K/UL  
 ABS. LYMPHOCYTES 0.6 (L) 0.9 - 3.6 K/UL  
 ABS. MONOCYTES 0.7 0.05 - 1.2 K/UL  
 ABS. EOSINOPHILS 0.0 0.0 - 0.4 K/UL  
 ABS. BASOPHILS 0.0 0.0 - 0.1 K/UL  
 DF AUTOMATED METABOLIC PANEL, COMPREHENSIVE Collection Time: 12/08/18  1:36 PM  
Result Value Ref Range Sodium 136 136 - 145 mmol/L Potassium 4.5 3.5 - 5.5 mmol/L Chloride 107 100 - 108 mmol/L  
 CO2 20 (L) 21 - 32 mmol/L Anion gap 9 3.0 - 18 mmol/L Glucose 115 (H) 74 - 99 mg/dL BUN 51 (H) 7.0 - 18 MG/DL Creatinine 3.17 (H) 0.6 - 1.3 MG/DL  
 BUN/Creatinine ratio 16 GFR est AA 24 (L) >60 ml/min/1.73m2 GFR est non-AA 20 (L) >60 ml/min/1.73m2 Calcium 8.7 8.5 - 10.1 MG/DL Bilirubin, total 0.5 0.2 - 1.0 MG/DL  
 ALT (SGPT) 21 16 - 61 U/L  
 AST (SGOT) 16 15 - 37 U/L Alk. phosphatase 96 45 - 117 U/L Protein, total 7.6 6.4 - 8.2 g/dL Albumin 3.6 3.4 - 5.0 g/dL Globulin 4.0 2.0 - 4.0 g/dL A-G Ratio 0.9 0.8 - 1.7 CARDIAC PANEL,(CK, CKMB & TROPONIN) Collection Time: 12/08/18  1:36 PM  
Result Value Ref Range CK 68 39 - 308 U/L  
 CK - MB 1.3 <3.6 ng/ml CK-MB Index 1.9 0.0 - 4.0 % Troponin-I, Qt. <0.02 0.0 - 0.045 NG/ML  
EKG, 12 LEAD, INITIAL Collection Time: 12/08/18  2:25 PM  
Result Value Ref Range Ventricular Rate 74 BPM  
 Atrial Rate 74 BPM  
 P-R Interval 170 ms QRS Duration 86 ms  
 Q-T Interval 394 ms QTC Calculation (Bezet) 437 ms Calculated P Axis 62 degrees Calculated R Axis 7 degrees Calculated T Axis 50 degrees Diagnosis Normal sinus rhythm Normal ECG When compared with ECG of 26-MAR-2015 13:57, No significant change was found Radiologic Studies:  
 
CT Results  (Last 48 hours) 12/08/18 1527  CT ABD PELV WO CONT Final result Impression:  IMPRESSION:  
   
1. Moderate left-sided hydronephrosis with asymmetric perinephric  
stranding/inflammation due to a 4-5 mm calculus in the proximal left ureter. 2.   Multiple additional smaller nonobstructing renal calculi. 3.   Diverticulosis. 4.   Subtle nonspecific nodular contour of the liver. Recommend correlation for  
history of underlying hepatocellular disease. Narrative:  EXAM: CT ABD PELV WO CONT  
   
CLINICAL INDICATION/HISTORY: flank pain.  
-Additional: None COMPARISON: March 19, 2015 TECHNIQUE: Axial CT imaging of the abdomen and pelvis was performed without  
intravenous contrast. Multiplanar reformats were generated. One or more dose  
reduction techniques were used on this CT: automated exposure control,  
adjustment of the mAs and/or kVp according to patient size, and iterative  
reconstruction techniques. The specific techniques used on this CT exam have  
been documented in the patient's electronic medical record.  
   
_______________ FINDINGS:  
   
LOWER CHEST: Low-attenuation the cardiac blood pool suggesting anemia. LIVER, BILIARY:   
  > Liver: Mildly nodular contour of the liver is noted. No focal mass lesion or  
biliary dilatation.  
  > Biliary: Unremarkable. SPLEEN: Unremarkable. PANCREAS: Unremarkable. ADRENALS: Unremarkable. KIDNEYS/URETERS/BLADDER: Moderate left-sided hydronephrosis with asymmetric  
perinephric fat stranding and inflammatory changes extending along the pararenal  
space secondary to a obstructing 4-5 mm calculus in the proximal left ureter. There are additional punctate nonobstructing calculi scattered bilaterally. PELVIC ORGANS: Within normal limits. LYMPH NODES: No enlarged lymph nodes.   
   
GASTROINTESTINAL TRACT: Extensive sigmoid diverticulosis and additional  
 scattered colonic diverticula. No active inflammation. No bowel obstruction. VASCULATURE: Minimal atherosclerosis. No aneurysm. MUSCULOSKELETAL: No acute findings. Mild lower lumbar spondylosis. Bony  
demineralization. OTHER: None.  
   
_______________ CXR Results  (Last 48 hours) 12/08/18 1442  XR ABD ACUTE W 1 V CHEST Final result Impression:  Impression:  
   
 Unremarkable chest PA and acute abdominal series. Narrative:  History: Left-sided flank pain. History of kidney stone. Chest PA. Comparison: 6/2/2012. Lungs appear clear and the cardiac silhouette mediastinum and pulmonary  
vascularity within normal limits and costophrenic angle sharp. There is no  
evidence subphrenic free air. Abdomen supine, erect. Comparison: CT abdomen pelvis 3/19/2015 12/8/2018. Bowel gas appears unremarkable. No free air. No air-fluid levels. Calcifications  
in the true pelvis appear to be vascular calcifications. The nephrolithiasis  
seen on the prior exam and the left ureterolithiasis is not appreciated today. Medical Decision Making I am the first provider for this patient. I reviewed the vital signs, available nursing notes, past medical history, past surgical history, family history and social history. Vital Signs: Reviewed the patient's vital signs. Pulse Oximetry Analysis: 100% on RA Cardiac Monitor: 
Rate: 74 bpm 
Rhythm: NSR 
 
EKG interpretation: (Preliminary) 2:25 PM  
NSR at 74 bpm; QRS duration of 86 ms; CO interval of 170 ms; Normal ECG; No ectopy EKG read by Faizan Nair PA-C at 2:29 PM  
 
Records Reviewed: Nursing Notes and Old Medical Records Procedures: 
Procedures ED Course:  
1:31 PM Initial assessment performed.  The patients presenting problems have been discussed, and they are in agreement with the care plan formulated and outlined with them. I have encouraged them to ask questions as they arise throughout their visit. 4:33 PM Discussed patient's history, exam, and available diagnostics results with Dr. Betty Herrera MD, Urology, who will take the pt to the OR for stent placement. Diagnosis and Disposition Core Measures: 
For Hospitalized Patients: 
 
1. Hospitalization Decision Time: The decision to hospitalize the patient was made by Faizan Nair PA-C at 4:33 PM on 12/8/2018 2. Aspirin: Aspirin was not given because the patient did not present with a stroke at the time of their Emergency Department evaluation 4:34 PM  Patient is being admitted to the hospital by Dr. Betty Herrera MD. The results of their tests and reasons for their admission have been discussed with them and/or available family. They convey agreement and understanding for the need to be admitted and for their admission diagnosis. CONDITIONS ON ADMISSION: 
Sepsis is not present at the time of admission. Deep Vein Thrombosis is not present at the time of admission. Thrombosis is not present at the time of admission. Urinary Tract Infection is not present at the time of admission. Pneumonia is not present at the time of admission. MRSA is not present at the time of admission. Wound infection is not present at the time of admission. Pressure Ulcer is not present at the time of admission. CLINICAL IMPRESSION: 
 
1. Ureterolithiasis 2. Left flank pain 3. Chronic kidney disease, unspecified CKD stage PLAN: 
1. Admit to Urology service with Dr. Karsten Rashid. ___________________________ Attestations:  
 
SCRIBE ATTESTATION: 
This note is prepared by Gabriele Neff, acting as Scribe for Faizan Nair PA-C. 
 
PROVIDER ATTESTATION: 
Faizan Nair PA-C: The scribe's documentation has been prepared under my direction and personally reviewed by me in its entirety.  I confirm that the note above accurately reflects all work, treatment, procedures, and medical decision making performed by me.  
___________________________

## 2018-12-08 NOTE — ANESTHESIA PREPROCEDURE EVALUATION
Anesthetic History No history of anesthetic complications Review of Systems / Medical History Patient summary reviewed, nursing notes reviewed and pertinent labs reviewed Pulmonary Within defined limits Neuro/Psych Cardiovascular Within defined limits Exercise tolerance: >4 METS 
  
GI/Hepatic/Renal 
  
GERD: well controlled Endo/Other Diabetes: well controlled Other Findings Physical Exam 
 
Airway Mallampati: II 
TM Distance: 4 - 6 cm Neck ROM: normal range of motion Mouth opening: Normal 
 
 Cardiovascular Dental 
 
 
  
Pulmonary Abdominal 
GI exam deferred Other Findings Anesthetic Plan ASA: 2, emergent Anesthesia type: general 
 
 
 
 
 
Anesthetic plan and risks discussed with: Patient

## 2018-12-09 ENCOUNTER — APPOINTMENT (OUTPATIENT)
Dept: GENERAL RADIOLOGY | Age: 68
End: 2018-12-09
Attending: UROLOGY
Payer: MEDICARE

## 2018-12-09 VITALS
WEIGHT: 170.02 LBS | BODY MASS INDEX: 23.03 KG/M2 | DIASTOLIC BLOOD PRESSURE: 75 MMHG | OXYGEN SATURATION: 100 % | TEMPERATURE: 97.8 F | SYSTOLIC BLOOD PRESSURE: 115 MMHG | RESPIRATION RATE: 16 BRPM | HEART RATE: 71 BPM | HEIGHT: 72 IN

## 2018-12-09 LAB
ANION GAP SERPL CALC-SCNC: 6 MMOL/L (ref 3–18)
BASOPHILS # BLD: 0 K/UL (ref 0–0.1)
BASOPHILS NFR BLD: 0 % (ref 0–2)
BUN SERPL-MCNC: 46 MG/DL (ref 7–18)
BUN/CREAT SERPL: 15
CALCIUM SERPL-MCNC: 8.5 MG/DL (ref 8.5–10.1)
CHLORIDE SERPL-SCNC: 110 MMOL/L (ref 100–108)
CO2 SERPL-SCNC: 24 MMOL/L (ref 21–32)
CREAT SERPL-MCNC: 2.97 MG/DL (ref 0.6–1.3)
DIFFERENTIAL METHOD BLD: ABNORMAL
EOSINOPHIL # BLD: 0.1 K/UL (ref 0–0.4)
EOSINOPHIL NFR BLD: 2 % (ref 0–5)
ERYTHROCYTE [DISTWIDTH] IN BLOOD BY AUTOMATED COUNT: 13.2 % (ref 11.6–14.5)
GLUCOSE BLD STRIP.AUTO-MCNC: 102 MG/DL (ref 70–110)
GLUCOSE SERPL-MCNC: 92 MG/DL (ref 74–99)
HCT VFR BLD AUTO: 31.5 % (ref 36–48)
HGB BLD-MCNC: 10 G/DL (ref 13–16)
LYMPHOCYTES # BLD: 1 K/UL (ref 0.9–3.6)
LYMPHOCYTES NFR BLD: 17 % (ref 21–52)
MCH RBC QN AUTO: 28.7 PG (ref 24–34)
MCHC RBC AUTO-ENTMCNC: 31.7 G/DL (ref 31–37)
MCV RBC AUTO: 90.5 FL (ref 74–97)
MONOCYTES # BLD: 0.6 K/UL (ref 0.05–1.2)
MONOCYTES NFR BLD: 11 % (ref 3–10)
NEUTS SEG # BLD: 4.1 K/UL (ref 1.8–8)
NEUTS SEG NFR BLD: 70 % (ref 40–73)
PLATELET # BLD AUTO: 210 K/UL (ref 135–420)
PMV BLD AUTO: 9.5 FL (ref 9.2–11.8)
POTASSIUM SERPL-SCNC: 4.8 MMOL/L (ref 3.5–5.5)
RBC # BLD AUTO: 3.48 M/UL (ref 4.7–5.5)
SODIUM SERPL-SCNC: 140 MMOL/L (ref 136–145)
WBC # BLD AUTO: 5.9 K/UL (ref 4.6–13.2)

## 2018-12-09 PROCEDURE — 74011250636 HC RX REV CODE- 250/636: Performed by: UROLOGY

## 2018-12-09 PROCEDURE — 80048 BASIC METABOLIC PNL TOTAL CA: CPT

## 2018-12-09 PROCEDURE — 99218 HC RM OBSERVATION: CPT

## 2018-12-09 PROCEDURE — 36415 COLL VENOUS BLD VENIPUNCTURE: CPT

## 2018-12-09 PROCEDURE — 74011250637 HC RX REV CODE- 250/637: Performed by: INTERNAL MEDICINE

## 2018-12-09 PROCEDURE — 85025 COMPLETE CBC W/AUTO DIFF WBC: CPT

## 2018-12-09 PROCEDURE — 74011250637 HC RX REV CODE- 250/637: Performed by: UROLOGY

## 2018-12-09 PROCEDURE — 74018 RADEX ABDOMEN 1 VIEW: CPT

## 2018-12-09 PROCEDURE — 82962 GLUCOSE BLOOD TEST: CPT

## 2018-12-09 RX ORDER — CEPHALEXIN 500 MG/1
500 CAPSULE ORAL EVERY 6 HOURS
Qty: 28 CAP | Refills: 0 | Status: SHIPPED | OUTPATIENT
Start: 2018-12-09

## 2018-12-09 RX ORDER — HYDROCODONE BITARTRATE AND ACETAMINOPHEN 5; 325 MG/1; MG/1
1 TABLET ORAL
Qty: 20 TAB | Refills: 0 | Status: SHIPPED | OUTPATIENT
Start: 2018-12-09

## 2018-12-09 RX ADMIN — CEPHALEXIN 500 MG: 250 CAPSULE ORAL at 13:43

## 2018-12-09 RX ADMIN — SITAGLIPTIN 50 MG: 25 TABLET, FILM COATED ORAL at 09:26

## 2018-12-09 RX ADMIN — CEPHALEXIN 500 MG: 250 CAPSULE ORAL at 06:48

## 2018-12-09 RX ADMIN — ATORVASTATIN CALCIUM 40 MG: 20 TABLET, FILM COATED ORAL at 09:26

## 2018-12-09 RX ADMIN — HEPARIN SODIUM 5000 UNITS: 5000 INJECTION INTRAVENOUS; SUBCUTANEOUS at 06:48

## 2018-12-09 RX ADMIN — IRBESARTAN 75 MG: 75 TABLET ORAL at 10:33

## 2018-12-09 RX ADMIN — PANTOPRAZOLE SODIUM 40 MG: 40 TABLET, DELAYED RELEASE ORAL at 06:48

## 2018-12-09 RX ADMIN — METFORMIN HYDROCHLORIDE 1000 MG: 500 TABLET ORAL at 09:25

## 2018-12-09 RX ADMIN — METOPROLOL SUCCINATE 25 MG: 25 TABLET, EXTENDED RELEASE ORAL at 09:26

## 2018-12-09 NOTE — PERIOP NOTES
Family updated via telephone, said she would probably come back to hospital tomorrow as she doesn't like driving at night. Notified of room # 333.

## 2018-12-09 NOTE — PERIOP NOTES
Patient transferred to room 333, [address] [place] [address], tele via stretcher, walked over to bed with assistance. Awake and alert. Blood pressure 134/66, pulse 80, temperature 97.7 °F (36.5 °C), resp. rate 16, height 6' (1.829 m), weight 77.1 kg (170 lb), SpO2 100 %. Francine RN at bedside.

## 2018-12-09 NOTE — PERIOP NOTES
Requested SBAR reading from 3 rd floor - pt with wedding ring on left hand and given eye glasses in PACU

## 2018-12-09 NOTE — PROGRESS NOTES
NUTRITION SCREENING Recommendations: Encourage adequate hydration and PO intake. Offer Glucerna Shake daily to help meet nutrition needs. RD ASSESSMENT/PLAN:  
 
Diet:  Consistent Carbohydrate Height: 6' (182.9 cm) Food Allergies: NKFA  Weight: 77.1 kg (170 lb 0.3 oz) PO Intake:No data found. Pertinent labs: BUN 46, Cr 2.97 
 
PMH: DM2, CKD stage 3, GERD, HTN, HLD, kidney stones BMI: 23.1 kg/m^2 is  normal weight (18.5%-24.9% BMI) Weight hx: -8# (4.5%) x 1 month and -12# (6.6%) x 1 year is moderate weight loss Current Hospital Problems: flank pain, N/V, dysgeusia, KERRY, urolithiasis Pt currently receiving IV hydration, noted with moderate unintended weight loss PTA. Recommend Glucerna shake daily to help meet kcal/protein needs. Encourage adequate hydration and nutrition intake. Full nutrition assessment to follow, pt at moderate nutritional risk. REASON FOR ASSESSMENT:  
[x]  RN Referral:  
 [x] MST score >/=2 Malnutrition Screening Tool (MST): 
Recently Lost Weight Without Trying: No 
If Yes, How Much Weight Loss: Unsure Eating Poorly Due to Decreased Appetite: No 
MST Score: 2 Olvin De La Paz RD Pager: [phone]

## 2018-12-09 NOTE — PERIOP NOTES
ordered patient be transferred to telemetry and she will see patient once he's on the floor. Will continue to monitor.

## 2018-12-09 NOTE — PROGRESS NOTES
Transition of care- home with MD follow up. Chart reviewed by CM on call. Met with pt at bedside. Oral and Written notification given to patient and/or caregiver informing them that they are currently an Outpatient receiving care in our facility. Outpatient services include Observation Services under [place] and [place] requirements. States lives with wife who will provide support and transportation. No DME needs identified. Care Management Interventions PCP Verified by CM: Yes(Dr. Jossue Pagan) Mode of Transport at Discharge: Other (see comment) Transition of Care Consult (CM Consult): Discharge Planning Current Support Network: Lives with Spouse Confirm Follow Up Transport: Family Plan discussed with Pt/Family/Caregiver: Yes Reason for Admission:  Urology RRAT Score: 12 Plan for utilizing home health: None at this time Likelihood of Readmission:  low Transition of Care Plan:    Home with MD f/u

## 2018-12-09 NOTE — PERIOP NOTES
TRANSFER - OUT REPORT: 
 
Verbal report given to STEWART Ya RN(name) on Clarine Bosworth  being transferred to [address] [place] [address](unit) for routine progression of care Report consisted of patients Situation, Background, Assessment and  
Recommendations(SBAR). Information from the following report(s) SBAR, OR Summary, Procedure Summary, Intake/Output and Cardiac Rhythm NSR with vent bigeminy was reviewed with the receiving nurse. Lines:  
Peripheral IV 12/08/18 Right Antecubital (Active) Site Assessment Clean, dry, & intact 12/8/2018  7:25 PM  
Phlebitis Assessment 0 12/8/2018  7:25 PM  
Infiltration Assessment 0 12/8/2018  7:25 PM  
Dressing Status Clean, dry, & intact 12/8/2018  7:25 PM  
Dressing Type Transparent;Tape 12/8/2018  7:25 PM  
Hub Color/Line Status Pink; Infusing;Patent 12/8/2018  7:25 PM  
  
 
Opportunity for questions and clarification was provided. Patient transported with: 
 Monitor O2 @ 2 liters Registered Nurse Tech Intake/Output Summary (Last 24 hours) at 12/8/2018 1936 Last data filed at 12/8/2018 8469 Gross per 24 hour Intake 2000 ml Output 0 ml Net 2000 ml

## 2018-12-09 NOTE — PROGRESS NOTES
[facility] Progress Note Assessment/Plan: 
· Doing   Well  S/p  Stent / push back  Stone · Will need  KUB / ESWL   DR Mai · Resume preop meds   Add norco/ keflex [unfilled] Vital Signs: 
Temp (24hrs), Av.6 °F (36.4 °C), Min:97 °F (36.1 °C), Max:98.2 °F (36.8 °C) Vitals:  
 18 2335 18 0330 18 2798 18 1122 BP: 122/63 128/68 120/66 115/75 Pulse: 82 85 (!) 58 71 Resp: 18  18 16 Temp: 98.2 °F (36.8 °C) 98.2 °F (36.8 °C) 97.7 °F (36.5 °C) 97.8 °F (36.6 °C) SpO2: 100% 100% 100% 100% Weight:  170 lb 0.3 oz (77.1 kg) Height:      
 
Visit Vitals /75 (BP 1 Location: Left arm, BP Patient Position: At rest) Pulse 71 Temp 97.8 °F (36.6 °C) Resp 16 Ht 6' (1.829 m) Wt 170 lb 0.3 oz (77.1 kg) SpO2 100% BMI 23.06 kg/m² [unfilled] Physical Exam: 
Vika Means CV :RRR Abdomen: soft , non tender :  
 
 
 
[unfilled] Current Facility-Administered Medications Medication Dose Route Frequency Provider Last Rate Last Dose  sodium chloride (NS) flush 5-10 mL  5-10 mL IntraVENous Q8H Camryn Calixto MD      
 sodium chloride (NS) flush 5-10 mL  5-10 mL IntraVENous PRN Hudson Acuña MD      
 sodium chloride (NS) flush 5-10 mL  5-10 mL IntraVENous Q8H Coretta Rivera MD      
 sodium chloride (NS) flush 5-10 mL  5-10 mL IntraVENous PRN Coretta Rivera MD      
 heparin (porcine) injection 5,000 Units  5,000 Units SubCUTAneous Q8H Coretta Rivera MD   5,000 Units at 18 7012  cephALEXin (KEFLEX) capsule 500 mg  500 mg Oral Q6H Coretta Rivera MD   500 mg at 18 6939  lactated Ringers infusion  75 mL/hr IntraVENous CONTINUOUS Coretta Rivera MD 75 mL/hr at 18 75 mL/hr at 18  pantoprazole (PROTONIX) tablet 40 mg  40 mg Oral Pietro Biswas MD   40 mg at 18 0769  irbesartan (AVAPRO) tablet 75 mg  75 mg Oral DAILY Joaquín Marcus MD   75 mg at 12/09/18 1033  
 atorvastatin (LIPITOR) tablet 40 mg  40 mg Oral DAILY Sandro Grady MD   40 mg at 12/09/18 6887  
 metFORMIN (GLUCOPHAGE) tablet 1,000 mg  1,000 mg Oral BID WITH MEALS Sandro Grady MD   1,000 mg at 12/09/18 5038 And  
 SITagliptin (JANUVIA) tablet tab 50 mg  50 mg Oral BID WITH MEALS Sandro Grady MD   50 mg at 12/09/18 5103  
 metoprolol succinate (TOPROL-XL) XL tablet 25 mg  25 mg Oral DAILY Sandro Grady MD   25 mg at 12/09/18 2429 Vega Morrissey MD, MD

## 2018-12-09 NOTE — DISCHARGE INSTRUCTIONS
Cardiac Arrhythmia: Care Instructions  Your Care Instructions    A cardiac arrhythmia is a change in the normal rhythm of the heart. Your heart may beat too fast or too slow or beat with an irregular or skipping rhythm. A change in the heart's rhythm may feel like a really strong heartbeat or a fluttering in your chest. A severe heart rhythm problem can keep the body from getting the blood it needs. This can result in shortness of breath, lightheadedness, and fainting. You may take medicine to treat your condition. Your doctor may recommend a pacemaker or recommend catheter ablation to destroy small parts of the heart that are causing a rhythm problem. Another possible treatment is an implantable cardioverter-defibrillator (ICD). An ICD is a device that gives the heart a shock to return the heart to a normal rhythm. Follow-up care is a key part of your treatment and safety. Be sure to make and go to all appointments, and call your doctor if you are having problems. It's also a good idea to know your test results and keep a list of the medicines you take. How can you care for yourself at home?  [facility]    · Be safe with medicines. Take your medicines exactly as prescribed. Call your doctor if you think you are having a problem with your medicine. You will get more details on the specific medicines your doctor prescribes.     · If you received a pacemaker or an ICD, you will get a fact sheet about it.     · Wear medical alert jewelry that says you have an abnormal heart rhythm. You can buy this at most drugstores.    Lifestyle changes    · Eat a heart-healthy diet.     · Stay at a healthy weight. Lose weight if you need to.     · Avoid nicotine, too much alcohol, and illegal drugs (meth, speed, and cocaine). Also, get enough sleep and do not overeat.     · Ask your doctor whether you can take over-the-counter medicines (such as decongestants).  These can make your heart beat fast.     · Talk to your doctor about any limits to activities, such as driving, or tasks where you use power tools or ladders. Activity    · Start light exercise if your doctor says you can. Even a small amount will help you get stronger, have more energy, and manage your stress.     · Get regular exercise. Try for 30 minutes on most days of the week. Ask your doctor what level of exercise is safe for you. If activity is not likely to cause health problems, you probably do not have limits on the type or level of activity that you can do. You may want to walk, swim, bike, or do other activities.     · When you exercise, watch for signs that your heart is working too hard. You are pushing too hard if you cannot talk while you exercise. If you become short of breath or dizzy or have chest pain, sit down and rest.     · Check your pulse daily. Place two fingers on the artery at the palm side of your wrist, in line with your thumb. If your heartbeat seems uneven, talk to your doctor. When should you call for help? Call 911 anytime you think you may need emergency care. For example, call if:    · You have symptoms of sudden heart failure. These may include:  ? Severe trouble breathing. ? A fast or irregular heartbeat. ? Coughing up pink, foamy mucus. ? You passed out.     · You have signs of a stroke. These include:  ? Sudden numbness, paralysis, or weakness in your face, arm, or leg, especially on only one side of your body. ? New problems with walking or balance. ? Sudden vision changes. ? Drooling or slurred speech. ? New problems speaking or understanding simple statements, or feeling confused. ? A sudden, severe headache that is different from past headaches.    Call your doctor now or seek immediate medical care if:    · You have new or changed symptoms of heart failure, such as:  ? New or increased shortness of breath. ? New or worse swelling in your legs, ankles, or feet.   ? Sudden weight gain, such as more than 2 to 3 pounds in a day or 5 pounds in a week. (Your doctor may suggest a different range of weight gain.)  ? Feeling dizzy or lightheaded or like you may faint. ? Feeling so tired or weak that you cannot do your usual activities. ? Not sleeping well. Shortness of breath wakes you at night. You need extra pillows to prop yourself up to breathe easier.    Watch closely for changes in your health, and be sure to contact your doctor if:    · You do not get better as expected. Where can you learn more? Go to http://joyce-hima.info/. Enter X048 in the search box to learn more about \"Cardiac Arrhythmia: Care Instructions. \"  Current as of: December 6, 2017  Content Version: 11.8  © 1647-2403 [employer]. Care instructions adapted under license by [employer] (which disclaims liability or warranty for this information). If you have questions about a medical condition or this instruction, always ask your healthcare professional. [address] any warranty or liability for your use of this information.

## 2018-12-09 NOTE — PROGRESS NOTES
1951 Pt arrived via stretcher from PACU to 333. Pt is alert, oriented and denies any pain. Oriented him to room, call bell and unit routines. Tele box #3 on reading NSR with bigeminy PVCs. 2015 Pt urinated 150cc pink urine. PVR bladder scan 168cc. 2023 Dr José Wallace is in to see the patient. A 12 lead EKG was performed that showed NSR with PVCs. 3034-6304 Shift Summary: Pt rested well overnight with no complaints except about his IV pump beeping. LR was hung by gravity because the patient stated it was that or no fluids. No new clinical concerns noted.  He

## 2018-12-09 NOTE — ROUTINE PROCESS
Bedside and Verbal shift change report given to Oly Mendoza RN  (oncoming nurse) by Argentina Sims RN  (offgoing nurse). Report given with SBAR, Kardex, Intake/Output and Recent Results.

## 2018-12-09 NOTE — CONSULTS
Medicine Consult    Patient:  Neida Peña 70 y.o. male  Asked to evaluate patient by Dr. Sydney Mixon  Primary Care Provider:  Other, MD Lindsay  Date of Admission:  12/8/2018  Reason for Consult:         Assessment/Plan     Active Problems:    Ureterolithiasis (84/8/8955)      Renal colic (72/5/9214)      Irregular heart beats ()      GERD (gastroesophageal reflux disease) ()      Chronic kidney disease ()      Overview: kidney stones. Cr =1.9      Arrhythmia ()      Overview: \"skipped beat\". No problems since on metoprolol      Acute kidney failure ([address] (12/8/2018)      In 11/2017      1. Negative adequate treadmill stress test.  2.There were occasional PACs, ventricular couplets, and rare ventricular triplets seen during rest and recovery. These were asymptomatic. 3.The echocardiogram reveals no evidence of ischemia or infarction,    A/P    The kidney failure is likely acute on chronic and is likely from obstruction which was fixed by the urologist with surgery tonight    I think the bigeminy is not really a problem, it appears to be part of an old issue which has been followed by his PCP. Will keep him on telemetry overnight . His Magnesium was ok. Diabetes is under control as well. Will continue his home meds    Recheck creatinine in am      Follow up with PCP about the possible chronic liver issues      Thank you for allowing us to participate in    HPI:   CC: Neida Peña is a 70 y.o. male with past medical history significant for (see below) who we were asked to consult for bigeminy noted in post op period in PACU. He came to ER with increasing flank pain and was found to be in Acute on chronic renal failure from an obstructing stone. He was taken directly to the OR and the problem was addressed. He denies chest pain, pedal edema or problems. He tells me he has long history of Irregular heart beats    Past Medical History:   Diagnosis Date    Arrhythmia     \"skipped beat\".  No problems since on metoprolol    Chronic kidney disease     kidney stones. Cr =1.9    Diabetes ([address] 2006    type 2    GERD (gastroesophageal reflux disease)     Irregular heart beats     Kidney stones     Other ill-defined conditions(799.89)     HIGH CHOLESTEROL    Reflux      Past Surgical History:   Procedure Laterality Date    HX ORTHOPAEDIC      KNEE    HX OTHER SURGICAL  1980's    varocele    HX UROLOGICAL  2010    lithotripsy X 1      Social History     Tobacco Use    Smoking status: Never Smoker   Substance Use Topics    Alcohol use: No     Alcohol/week: 0.5 oz     Types: 1 Standard drinks or equivalent per week     Frequency: Never    Drug use: No     History reviewed. No pertinent family history. No current facility-administered medications on file prior to encounter. Current Outpatient Medications on File Prior to Encounter   Medication Sig Dispense Refill    SITagliptin-metFORMIN (JANUMET) 50-1,000 mg per tablet Take 1 Tab by mouth daily (with breakfast).  atorvastatin (LIPITOR) 40 mg tablet Take 40 mg by mouth daily.  metoprolol succinate (TOPROL-XL) 25 mg XL tablet Take 25 mg by mouth daily.  irbesartan (AVAPRO) 75 mg tablet Take 75 mg by mouth daily.  RABEprazole (ACIPHEX) 20 mg tablet Take 20 mg by mouth daily.  aspirin 81 mg chewable tablet Take 81 mg by mouth daily.         Allergies   Allergen Reactions    Sulfa (Sulfonamide Antibiotics) Hives           Review of Systems  Constitutional: No fever, chills, diaphoresis, malaise, fatigue or weight gain/loss or falls  Skin: no itching or rashes  HEENT: no ear discomfort, hearing loss, tinnitus, epistaxis or sore throat  EYES: no blurry vision, double vision or photophobia  CARDIOVASCULAR: no claudication, cp, palpitations, orthopnea, pnd or LE edema  PULMONARY: no cough, wheeze, shortness of breath or sputum production  GI: no nausea, vomiting, diarrhea, abdominal pain, melena, hematemesis or brbpr  : no dysuria, hematuria  MUSCULOSKELETAL: no back pain, joint pain or myalgias  ENDOCRINE: no heat/cold intolerance, polyuria or polydipsia  HEME: no easy bruising or bleeding  NEURO: no unilateral weakness, numbness, tingling or seizures      Physical Exam:      Visit Vitals  /66   Pulse 80   Temp 97.7 °F (36.5 °C)   Resp 16   Ht 6' (1.829 m)   Wt 77.1 kg (170 lb)   SpO2 100%   BMI 23.06 kg/m²     Body mass index is 23.06 kg/m².     Physical Exam:  GEN: well nourished, laying in bed in no acute distress  HEENT: atraumatic, nose normal,oropharynx clear, MMM  NECK: supple, trachea midline, no supraclavicular or submandibular adenopathy noted  EYES: conjuctiva normal, lids with out lesions, PERRL  HEART: RRR with out m/r/g, pmi nondisplaced, pulses 2+ distally  LUNGS: equal chest wall expansion, cta bl with out wheezes/rales or rhonchi  AB: soft, +BS, nt/nd no organomegaly  NEURO: alert, awake and oriented x3, gait not assessed, cranial nerves intact, strength 5/5 bl UE and LE, sensation intact, reflexes nonpathological  SKIN: dry, intact, warm no breakdown noted        Laboratory Studies:    Recent Results (from the past 24 hour(s))   URINALYSIS W/ RFLX MICROSCOPIC    Collection Time: 12/08/18  1:34 PM   Result Value Ref Range    Color YELLOW      Appearance CLEAR      Specific gravity 1.020 1.005 - 1.030      pH (UA) 5.0 5.0 - 8.0      Protein 30 (A) NEG mg/dL    Glucose NEGATIVE  NEG mg/dL    Ketone NEGATIVE  NEG mg/dL    Bilirubin NEGATIVE  NEG      Blood NEGATIVE  NEG      Urobilinogen 0.2 0.2 - 1.0 EU/dL    Nitrites NEGATIVE  NEG      Leukocyte Esterase NEGATIVE  NEG     URINE MICROSCOPIC ONLY    Collection Time: 12/08/18  1:34 PM   Result Value Ref Range    WBC 0 to 3 0 - 5 /hpf    RBC 0 to 3 0 - 5 /hpf    Epithelial cells FEW 0 - 5 /lpf    Bacteria 1+ (A) NEG /hpf   CBC WITH AUTOMATED DIFF    Collection Time: 12/08/18  1:36 PM   Result Value Ref Range    WBC 8.2 4.6 - 13.2 K/uL    RBC 3.59 (L) 4.70 - 5.50 M/uL    HGB 10.6 (L) 13.0 - 16.0 g/dL    HCT 37.1 36.0 - 48.0 %    .3 (H) 74.0 - 97.0 FL    MCH 29.5 24.0 - 34.0 PG    MCHC 28.6 (L) 31.0 - 37.0 g/dL    RDW 13.8 11.6 - 14.5 %    PLATELET 098 296 - 175 K/uL    MPV 11.5 9.2 - 11.8 FL    NEUTROPHILS 84 (H) 40 - 73 %    LYMPHOCYTES 7 (L) 21 - 52 %    MONOCYTES 9 3 - 10 %    EOSINOPHILS 0 0 - 5 %    BASOPHILS 0 0 - 2 %    ABS. NEUTROPHILS 6.8 1.8 - 8.0 K/UL    ABS. LYMPHOCYTES 0.6 (L) 0.9 - 3.6 K/UL    ABS. MONOCYTES 0.7 0.05 - 1.2 K/UL    ABS. EOSINOPHILS 0.0 0.0 - 0.4 K/UL    ABS. BASOPHILS 0.0 0.0 - 0.1 K/UL    DF AUTOMATED     METABOLIC PANEL, COMPREHENSIVE    Collection Time: 12/08/18  1:36 PM   Result Value Ref Range    Sodium 136 136 - 145 mmol/L    Potassium 4.5 3.5 - 5.5 mmol/L    Chloride 107 100 - 108 mmol/L    CO2 20 (L) 21 - 32 mmol/L    Anion gap 9 3.0 - 18 mmol/L    Glucose 115 (H) 74 - 99 mg/dL    BUN 51 (H) 7.0 - 18 MG/DL    Creatinine 3.17 (H) 0.6 - 1.3 MG/DL    BUN/Creatinine ratio 16      GFR est AA 24 (L) >60 ml/min/1.73m2    GFR est non-AA 20 (L) >60 ml/min/1.73m2    Calcium 8.7 8.5 - 10.1 MG/DL    Bilirubin, total 0.5 0.2 - 1.0 MG/DL    ALT (SGPT) 21 16 - 61 U/L    AST (SGOT) 16 15 - 37 U/L    Alk.  phosphatase 96 45 - 117 U/L    Protein, total 7.6 6.4 - 8.2 g/dL    Albumin 3.6 3.4 - 5.0 g/dL    Globulin 4.0 2.0 - 4.0 g/dL    A-G Ratio 0.9 0.8 - 1.7     CARDIAC PANEL,(CK, CKMB & TROPONIN)    Collection Time: 12/08/18  1:36 PM   Result Value Ref Range    CK 68 39 - 308 U/L    CK - MB 1.3 <3.6 ng/ml    CK-MB Index 1.9 0.0 - 4.0 %    Troponin-I, Qt. <0.02 0.0 - 0.045 NG/ML   EKG, 12 LEAD, INITIAL    Collection Time: 12/08/18  2:25 PM   Result Value Ref Range    Ventricular Rate 74 BPM    Atrial Rate 74 BPM    P-R Interval 170 ms    QRS Duration 86 ms    Q-T Interval 394 ms    QTC Calculation (Bezet) 437 ms    Calculated P Axis 62 degrees    Calculated R Axis 7 degrees    Calculated T Axis 50 degrees    Diagnosis       Normal sinus rhythm  Normal ECG  When compared with ECG of 26-MAR-2015 13:57,  No significant change was found     GLUCOSE, POC    Collection Time: 12/08/18  5:55 PM   Result Value Ref Range    Glucose (POC) 108 70 - 110 mg/dL   GLUCOSE, POC    Collection Time: 12/08/18  6:58 PM   Result Value Ref Range    Glucose (POC) 100 70 - 110 mg/dL   TROPONIN I    Collection Time: 12/08/18  8:12 PM   Result Value Ref Range    Troponin-I, Qt. <0.02 0.0 - 0.045 NG/ML   MAGNESIUM    Collection Time: 12/08/18  8:12 PM   Result Value Ref Range    Magnesium 1.8 1.6 - 2.6 mg/dL   EKG, 12 LEAD, SUBSEQUENT    Collection Time: 12/08/18  8:20 PM   Result Value Ref Range    Ventricular Rate 77 BPM    Atrial Rate 77 BPM    P-R Interval 170 ms    QRS Duration 72 ms    Q-T Interval 374 ms    QTC Calculation (Bezet) 423 ms    Calculated P Axis 65 degrees    Calculated R Axis -18 degrees    Calculated T Axis 8 degrees    Diagnosis       Sinus rhythm with frequent premature ventricular complexes  Septal infarct , age undetermined  Abnormal ECG  When compared with ECG of 08-DEC-2018 14:25,  premature ventricular complexes are now present  Septal infarct is now present       Imaging studies personally reviewed:        1. Moderate left-sided hydronephrosis with asymmetric perinephric  stranding/inflammation due to a 4-5 mm calculus in the proximal left ureter. 2.   Multiple additional smaller nonobstructing renal calculi. 3.   Diverticulosis. 4.   Subtle nonspecific nodular contour of the liver.  Recommend correlation for  history of underlying hepatocellular disease.

## 2018-12-09 NOTE — OP NOTES
[facility] [place]  OPERATIVE REPORT    Chalmer Kehr  MR#: [id number]  : 1950  ACCOUNT #: [de-identified]   DATE OF SERVICE: 2018    PREOPERATIVE DIAGNOSIS:  Obstructing left ureteral stone with acute on chronic renal insufficiency. POSTOPERATIVE DIAGNOSIS:  Obstructing left ureteral stone with acute on chronic renal insufficiency. PROCEDURE PERFORMED:  Cystoscopy, left retrograde pyelogram, left double-J stent placement. ANESTHESIA:  General by Dr. Zoltan Brown. SURGEON:  Royce Emanuel MD    ASSISTANT:      ESTIMATED BLOOD LOSS:  None. COMPLICATIONS:  None. SPECIMENS REMOVED:  None. IMPLANTS:      TUBES AND DRAINS:  A 6 x 24 cm double-J stent placed in the left side. The patient is a 69-year-old male with a history of stones, who was admitted with about a month of left flank pain, had some renal insufficiency, creatinine went from 2-3 and elected to have a stent placed. He was taken to the operating room, placed in dorsal lithotomy position, genitalia prepped and draped. Cystourethroscopy was performed with a 23-Citizen of [place] scope, 30 and 70 degree lenses. The anterior urethra was normal.  The prostatic fossa was a little stenotic. The trigone was unremarkable. Bladder was unremarkable other than a little trabeculation. A left retrograde pyelogram was performed with an open-ended catheter and the distal ureter was fine and there was an obstructing filling defect in the proximal ureter, which subsequently popped into the renal pelvis. A 0.035 guidewire was then inserted up the left side and a 6-Citizen of [place] 24 cm double-J stent was placed over the guidewire and the patient tolerated the procedure well and transferred to the recovery room in stable condition. Jacy Rodríguez.  MD HERMELINDA Hamilton / Amanda Duvall  D: 2018 19:12     T: 2018 23:30  JOB #: [id number]

## 2018-12-09 NOTE — PERIOP NOTES
aware patient is having ECG changes from earlier in day NSR with vent  Bigeminy, otherwise asymtptomatic. He ordered hospitalist consult. Will continue to monitor.

## 2018-12-09 NOTE — ANESTHESIA POSTPROCEDURE EVALUATION
Post-Anesthesia Evaluation and Assessment Patient: Yarelis Echeverria MRN: [id number]  SSN: [id number] YOB: 1950  Age: 70 y.o. Sex: male Cardiovascular Function/Vital Signs /69   Pulse 82   Temp 36.3 °C (97.3 °F)   Resp 15   Ht 6' (1.829 m)   Wt 77.1 kg (170 lb)   SpO2 100%   BMI 23.06 kg/m² Patient is status post Procedure(s): 
CYSTOSCOPY, LEFT RETROGRADE PYELOGRAM, STENT PLACEMENT W/ C-ARM. Nausea/Vomiting: Controlled. Postoperative hydration reviewed and adequate. Pain: 
Pain Scale 1: Numeric (0 - 10) (12/08/18 1925) Pain Intensity 1: 0 (12/08/18 1925) Managed. Neurological Status:  
Neuro (WDL): Exceptions to WDL (12/08/18 1925) At baseline. Mental Status and Level of Consciousness: Arousable. Pulmonary Status:  
O2 Device: Nasal cannula (12/08/18 1900) Adequate oxygenation and airway patent. Complications related to anesthesia: None Post-anesthesia assessment completed. No concerns. Patient has met all discharge requirements. Signed By: Nora Jacques CRNA December 8, 2018 Hospitalist consult for bigeminy (EKG earlier was NSR). Pt admitted to telemetry and hospitalist will evaluate patient tonite Procedure(s): 
CYSTOSCOPY, LEFT RETROGRADE PYELOGRAM, STENT PLACEMENT W/ C-ARM. <BSHSIANPOST> Visit Vitals /69 Pulse 82 Temp 36.3 °C (97.3 °F) Resp 15 Ht 6' (1.829 m) Wt 77.1 kg (170 lb) SpO2 100% BMI 23.06 kg/m²

## 2018-12-09 NOTE — H&P
History & Physical 
Patient: Gill Anderson MRN: [id number]  CSN: [id number] YOB: 1950  Age: 70 y.o. Sex: male DOA: 12/8/2018 Primary Care Provider:  Other, MD Lindsay 
 
 
Assessment/Plan HTN 
DM2 with renal complications. KERRY/CKD3. Urolithiasis chronic intermittent. Hyperlipidemia. Bigeminy / PVC's. Cardiac status stable. Continue home meds including beta blocker. Hydrate. Renal fxn improving. At baseline creatinine 2.19 in 11/2018 at nephrologist's office. CKD attributed to dm. Suspect with hydration, good urinary flow and avoidance of nephrotoxins, he should return to his baseline creatinine. Reviewed nephrology office note from [facility] 11/2018. Can dc home from medical perspective if meets urology criteria. CC: kerry/ckd HPI:  
 
Gill Anderson is a 70 y.o. male who  We are following in consultation. Past Medical History:  
Diagnosis Date  Arrhythmia \"skipped beat\". No problems since on metoprolol  Chronic kidney disease   
 kidney stones. Cr =1.9  Diabetes ([address] 2006  
 type 2  
 GERD (gastroesophageal reflux disease)  Irregular heart beats  Kidney stones  Other ill-defined conditions(799.89) HIGH CHOLESTEROL  Reflux Past Surgical History:  
Procedure Laterality Date  HX ORTHOPAEDIC    
 KNEE  
 HX OTHER SURGICAL  1980's  
 varocele  HX UROLOGICAL  2010  
 lithotripsy X 1 History reviewed. No pertinent family history. Social History Socioeconomic History  Marital status:  Spouse name: Not on file  Number of children: Not on file  Years of education: Not on file  Highest education level: Not on file Tobacco Use  Smoking status: Never Smoker Substance and Sexual Activity  Alcohol use: No  
  Alcohol/week: 0.5 oz Types: 1 Standard drinks or equivalent per week Frequency: Never  Drug use: No  
 
 
Prior to Admission medications Medication Sig Start Date End Date Taking? Authorizing Provider SITagliptin-metFORMIN (JANUMET) 50-1,000 mg per tablet Take 1 Tab by mouth daily (with breakfast). Yes Provider, Historical  
atorvastatin (LIPITOR) 40 mg tablet Take 40 mg by mouth daily. Yes Provider, Historical  
metoprolol succinate (TOPROL-XL) 25 mg XL tablet Take 25 mg by mouth daily. Yes Provider, Historical  
irbesartan (AVAPRO) 75 mg tablet Take 75 mg by mouth daily. Yes Provider, Historical  
RABEprazole (ACIPHEX) 20 mg tablet Take 20 mg by mouth daily. Yes Provider, Historical  
aspirin 81 mg chewable tablet Take 81 mg by mouth daily. Yes Other, MD Lindsay  
 
 
Allergies Allergen Reactions  Sulfa (Sulfonamide Antibiotics) Hives Review of Systems Gen: No fever, chills, malaise, weight loss/gain. Heent: No headache, rhinorrhea, epistaxis, ear pain, hearing loss, sinus pain, neck pain/stiffness, sore throat. Heart: No chest pain, palpitations, ARREOLA, pnd, or orthopnea. Resp: No cough, hemoptysis, wheezing and shortness of breath. GI: No nausea, vomiting, diarrhea, constipation, melena or hematochezia. : No urinary obstruction, dysuria or hematuria. Derm: No rash, new skin lesion or pruritis. Musc/skeletal: no bone or joint complains. Vasc: No edema, cyanosis or claudication. Endo: No heat/cold intolerance, no polyuria,polydipsia or polyphagia. Neuro: No unilateral weakness, numbness, tingling. No seizures. Heme: No easy bruising or bleeding. Physical Exam:  
 
Physical Exam: 
Visit Vitals /66 (BP 1 Location: Left arm, BP Patient Position: At rest) Pulse (!) 58 Temp 97.7 °F (36.5 °C) Resp 18 Ht 6' (1.829 m) Wt 77.1 kg (170 lb 0.3 oz) SpO2 100% BMI 23.06 kg/m² O2 Flow Rate (L/min): 2 l/min O2 Device: Room air Temp (24hrs), Av.6 °F (36.4 °C), Min:97 °F (36.1 °C), Max:98.2 °F (36.8 °C) 701 - 1900 In: -  
 Out: 500 [Urine:500]   12/07 1901 - 12/09 0700 In: 2000 [I.V.:2000] Out: 900 [Urine:900] General:  Awake, cooperative, no distress. Head:  Normocephalic, without obvious abnormality, atraumatic. Eyes:  Conjunctivae/corneas clear, sclera anicteric, PERRL, EOMs intact. Nose: Nares normal. No drainage or sinus tenderness. Throat: Lips, mucosa, and tongue normal.   
Neck: Supple, symmetrical, trachea midline, no adenopathy. Lungs:   Clear to auscultation bilaterally. Heart:  Regular rate and rhythm, S1, S2 normal, no murmur, click, rub or gallop. Abdomen: Soft, non-tender. Bowel sounds normal. No masses,  No organomegaly. Extremities: Extremities normal, atraumatic, no cyanosis or edema. Capillary refill normal.  
Pulses: 2+ and symmetric all extremities. Skin: Skin color pink/pale/mottled/flushed, turgor normal. No rashes or lesions Neurologic: CNII-XII intact. No focal motor or sensory deficit. Labs Reviewed: All lab results for the last 24 hours reviewed. Recent Results (from the past 24 hour(s)) URINALYSIS W/ RFLX MICROSCOPIC Collection Time: 12/08/18  1:34 PM  
Result Value Ref Range Color YELLOW Appearance CLEAR Specific gravity 1.020 1.005 - 1.030    
 pH (UA) 5.0 5.0 - 8.0 Protein 30 (A) NEG mg/dL Glucose NEGATIVE  NEG mg/dL Ketone NEGATIVE  NEG mg/dL Bilirubin NEGATIVE  NEG Blood NEGATIVE  NEG Urobilinogen 0.2 0.2 - 1.0 EU/dL Nitrites NEGATIVE  NEG Leukocyte Esterase NEGATIVE  NEG    
URINE MICROSCOPIC ONLY Collection Time: 12/08/18  1:34 PM  
Result Value Ref Range WBC 0 to 3 0 - 5 /hpf  
 RBC 0 to 3 0 - 5 /hpf Epithelial cells FEW 0 - 5 /lpf Bacteria 1+ (A) NEG /hpf  
CBC WITH AUTOMATED DIFF Collection Time: 12/08/18  1:36 PM  
Result Value Ref Range WBC 8.2 4.6 - 13.2 K/uL  
 RBC 3.59 (L) 4.70 - 5.50 M/uL  
 HGB 10.6 (L) 13.0 - 16.0 g/dL HCT 37.1 36.0 - 48.0 %  .3 (H) 74.0 - 97.0 FL  
 MCH 29.5 24.0 - 34.0 PG  
 MCHC 28.6 (L) 31.0 - 37.0 g/dL  
 RDW 13.8 11.6 - 14.5 % PLATELET 225 286 - 448 K/uL MPV 11.5 9.2 - 11.8 FL  
 NEUTROPHILS 84 (H) 40 - 73 % LYMPHOCYTES 7 (L) 21 - 52 % MONOCYTES 9 3 - 10 % EOSINOPHILS 0 0 - 5 % BASOPHILS 0 0 - 2 %  
 ABS. NEUTROPHILS 6.8 1.8 - 8.0 K/UL  
 ABS. LYMPHOCYTES 0.6 (L) 0.9 - 3.6 K/UL  
 ABS. MONOCYTES 0.7 0.05 - 1.2 K/UL  
 ABS. EOSINOPHILS 0.0 0.0 - 0.4 K/UL  
 ABS. BASOPHILS 0.0 0.0 - 0.1 K/UL  
 DF AUTOMATED METABOLIC PANEL, COMPREHENSIVE Collection Time: 12/08/18  1:36 PM  
Result Value Ref Range Sodium 136 136 - 145 mmol/L Potassium 4.5 3.5 - 5.5 mmol/L Chloride 107 100 - 108 mmol/L  
 CO2 20 (L) 21 - 32 mmol/L Anion gap 9 3.0 - 18 mmol/L Glucose 115 (H) 74 - 99 mg/dL BUN 51 (H) 7.0 - 18 MG/DL Creatinine 3.17 (H) 0.6 - 1.3 MG/DL  
 BUN/Creatinine ratio 16 GFR est AA 24 (L) >60 ml/min/1.73m2 GFR est non-AA 20 (L) >60 ml/min/1.73m2 Calcium 8.7 8.5 - 10.1 MG/DL Bilirubin, total 0.5 0.2 - 1.0 MG/DL  
 ALT (SGPT) 21 16 - 61 U/L  
 AST (SGOT) 16 15 - 37 U/L Alk. phosphatase 96 45 - 117 U/L Protein, total 7.6 6.4 - 8.2 g/dL Albumin 3.6 3.4 - 5.0 g/dL Globulin 4.0 2.0 - 4.0 g/dL A-G Ratio 0.9 0.8 - 1.7 CARDIAC PANEL,(CK, CKMB & TROPONIN) Collection Time: 12/08/18  1:36 PM  
Result Value Ref Range CK 68 39 - 308 U/L  
 CK - MB 1.3 <3.6 ng/ml CK-MB Index 1.9 0.0 - 4.0 % Troponin-I, Qt. <0.02 0.0 - 0.045 NG/ML  
EKG, 12 LEAD, INITIAL Collection Time: 12/08/18  2:25 PM  
Result Value Ref Range Ventricular Rate 74 BPM  
 Atrial Rate 74 BPM  
 P-R Interval 170 ms QRS Duration 86 ms  
 Q-T Interval 394 ms QTC Calculation (Bezet) 437 ms Calculated P Axis 62 degrees Calculated R Axis 7 degrees Calculated T Axis 50 degrees Diagnosis Normal sinus rhythm Normal ECG When compared with ECG of 26-MAR-2015 13:57, No significant change was found GLUCOSE, POC Collection Time: 12/08/18  5:55 PM  
Result Value Ref Range Glucose (POC) 108 70 - 110 mg/dL GLUCOSE, POC Collection Time: 12/08/18  6:58 PM  
Result Value Ref Range Glucose (POC) 100 70 - 110 mg/dL TROPONIN I Collection Time: 12/08/18  8:12 PM  
Result Value Ref Range Troponin-I, Qt. <0.02 0.0 - 0.045 NG/ML  
MAGNESIUM Collection Time: 12/08/18  8:12 PM  
Result Value Ref Range Magnesium 1.8 1.6 - 2.6 mg/dL EKG, 12 LEAD, SUBSEQUENT Collection Time: 12/08/18  8:20 PM  
Result Value Ref Range Ventricular Rate 77 BPM  
 Atrial Rate 77 BPM  
 P-R Interval 170 ms QRS Duration 72 ms Q-T Interval 374 ms QTC Calculation (Bezet) 423 ms Calculated P Axis 65 degrees Calculated R Axis -18 degrees Calculated T Axis 8 degrees Diagnosis Sinus rhythm with frequent premature ventricular complexes Septal infarct , age undetermined Abnormal ECG When compared with ECG of 08-DEC-2018 14:25, 
premature ventricular complexes are now present Septal infarct is now present METABOLIC PANEL, BASIC Collection Time: 12/09/18  3:35 AM  
Result Value Ref Range Sodium 140 136 - 145 mmol/L Potassium 4.8 3.5 - 5.5 mmol/L Chloride 110 (H) 100 - 108 mmol/L  
 CO2 24 21 - 32 mmol/L Anion gap 6 3.0 - 18 mmol/L Glucose 92 74 - 99 mg/dL BUN 46 (H) 7.0 - 18 MG/DL Creatinine 2.97 (H) 0.6 - 1.3 MG/DL  
 BUN/Creatinine ratio 15 GFR est AA 26 (L) >60 ml/min/1.73m2 GFR est non-AA 21 (L) >60 ml/min/1.73m2 Calcium 8.5 8.5 - 10.1 MG/DL  
CBC WITH AUTOMATED DIFF Collection Time: 12/09/18  3:35 AM  
Result Value Ref Range WBC 5.9 4.6 - 13.2 K/uL  
 RBC 3.48 (L) 4.70 - 5.50 M/uL  
 HGB 10.0 (L) 13.0 - 16.0 g/dL HCT 31.5 (L) 36.0 - 48.0 % MCV 90.5 74.0 - 97.0 FL  
 MCH 28.7 24.0 - 34.0 PG  
 MCHC 31.7 31.0 - 37.0 g/dL  
 RDW 13.2 11.6 - 14.5 % PLATELET 443 315 - 957 K/uL MPV 9.5 9.2 - 11.8 FL  
 NEUTROPHILS 70 40 - 73 % LYMPHOCYTES 17 (L) 21 - 52 % MONOCYTES 11 (H) 3 - 10 % EOSINOPHILS 2 0 - 5 % BASOPHILS 0 0 - 2 %  
 ABS. NEUTROPHILS 4.1 1.8 - 8.0 K/UL  
 ABS. LYMPHOCYTES 1.0 0.9 - 3.6 K/UL  
 ABS. MONOCYTES 0.6 0.05 - 1.2 K/UL  
 ABS. EOSINOPHILS 0.1 0.0 - 0.4 K/UL  
 ABS.  BASOPHILS 0.0 0.0 - 0.1 K/UL  
 DF AUTOMATED    
 
 
 
 
 
CC: Other, MD Lindsay

## 2018-12-31 LAB
ATRIAL RATE: 74 BPM
ATRIAL RATE: 77 BPM
CALCULATED P AXIS, ECG09: 62 DEGREES
CALCULATED P AXIS, ECG09: 65 DEGREES
CALCULATED R AXIS, ECG10: -18 DEGREES
CALCULATED R AXIS, ECG10: 7 DEGREES
CALCULATED T AXIS, ECG11: 50 DEGREES
CALCULATED T AXIS, ECG11: 8 DEGREES
DIAGNOSIS, 93000: NORMAL
DIAGNOSIS, 93000: NORMAL
P-R INTERVAL, ECG05: 170 MS
P-R INTERVAL, ECG05: 170 MS
Q-T INTERVAL, ECG07: 374 MS
Q-T INTERVAL, ECG07: 394 MS
QRS DURATION, ECG06: 72 MS
QRS DURATION, ECG06: 86 MS
QTC CALCULATION (BEZET), ECG08: 423 MS
QTC CALCULATION (BEZET), ECG08: 437 MS
VENTRICULAR RATE, ECG03: 74 BPM
VENTRICULAR RATE, ECG03: 77 BPM

## (undated) DEVICE — DRAPE TWL SURG 16X26IN BLU ORB04] ALLCARE INC]

## (undated) DEVICE — CATHETER URET 5FR L70CM POLYUR CONE FLX TIP KINK RESIST W/

## (undated) DEVICE — SPONGE GZ W4XL4IN COT 12 PLY TYP VII WVN C FLD DSGN

## (undated) DEVICE — CATH URET 5FRX70CM W/OPN END -- BX/20

## (undated) DEVICE — KENDALL SCD EXPRESS SLEEVES, KNEE LENGTH, MEDIUM: Brand: KENDALL SCD

## (undated) DEVICE — PACK,CYSTOSCOPY,PK III,AURORA: Brand: MEDLINE

## (undated) DEVICE — SYR IRR CATH TIP LR ADPT 70ML -- CONVERT TO ITEM 363120

## (undated) DEVICE — SYR 10ML LUER LOK 1/5ML GRAD --

## (undated) DEVICE — DEVON™ KNEE AND BODY STRAP 60" X 3" (1.5 M X 7.6 CM): Brand: DEVON

## (undated) DEVICE — SOL IRR STRL H2O 1500ML BTL --

## (undated) DEVICE — SOLUTION IRRIG 3000ML 0.9% SOD CHL FLX CONT 0797208] ICU MEDICAL INC]

## (undated) DEVICE — TRAY PREP DRY W/ PREM GLV 2 APPL 6 SPNG 2 UNDPD 1 OVERWRAP

## (undated) DEVICE — Y-TYPE TUR/BLADDER IRRIGATION SET, REGULATING CLAMP